# Patient Record
Sex: FEMALE | Race: WHITE | Employment: FULL TIME | URBAN - METROPOLITAN AREA
[De-identification: names, ages, dates, MRNs, and addresses within clinical notes are randomized per-mention and may not be internally consistent; named-entity substitution may affect disease eponyms.]

---

## 2019-10-25 ENCOUNTER — MEDICAL CORRESPONDENCE (OUTPATIENT)
Dept: HEALTH INFORMATION MANAGEMENT | Facility: CLINIC | Age: 34
End: 2019-10-25

## 2022-03-04 ENCOUNTER — TELEPHONE (OUTPATIENT)
Dept: NEUROLOGY | Facility: CLINIC | Age: 37
End: 2022-03-04
Payer: COMMERCIAL

## 2022-03-04 NOTE — TELEPHONE ENCOUNTER
Health Call Center    Phone Message    May a detailed message be left on voicemail: yes     Reason for Call: Appointment Intake    Referring Provider Name: Dr. Vicenta Dominguez, this is a pt of Dr. Dominguez's following to the new practice, MS pt   Diagnosis and/or Symptoms: MS    Please call pt to schedule for MS f/u with Dr. Dominguez. Thank you.      Action Taken: Message routed to:  [facility]    Travel Screening: Not Applicable

## 2022-05-29 ENCOUNTER — HEALTH MAINTENANCE LETTER (OUTPATIENT)
Age: 37
End: 2022-05-29

## 2022-07-25 ENCOUNTER — LAB (OUTPATIENT)
Dept: LAB | Facility: CLINIC | Age: 37
End: 2022-07-25
Payer: COMMERCIAL

## 2022-07-25 ENCOUNTER — OFFICE VISIT (OUTPATIENT)
Dept: NEUROLOGY | Facility: CLINIC | Age: 37
End: 2022-07-25

## 2022-07-25 VITALS — DIASTOLIC BLOOD PRESSURE: 72 MMHG | HEART RATE: 78 BPM | SYSTOLIC BLOOD PRESSURE: 112 MMHG

## 2022-07-25 DIAGNOSIS — G35 MS (MULTIPLE SCLEROSIS) (H): ICD-10-CM

## 2022-07-25 DIAGNOSIS — G35 MS (MULTIPLE SCLEROSIS) (H): Primary | ICD-10-CM

## 2022-07-25 DIAGNOSIS — Z51.81 THERAPEUTIC DRUG MONITORING: ICD-10-CM

## 2022-07-25 DIAGNOSIS — E55.9 VITAMIN D DEFICIENCY: ICD-10-CM

## 2022-07-25 LAB
BASOPHILS # BLD AUTO: 0.1 10E3/UL (ref 0–0.2)
BASOPHILS NFR BLD AUTO: 1 %
EOSINOPHIL # BLD AUTO: 0.4 10E3/UL (ref 0–0.7)
EOSINOPHIL NFR BLD AUTO: 5 %
ERYTHROCYTE [DISTWIDTH] IN BLOOD BY AUTOMATED COUNT: 11.8 % (ref 10–15)
HCT VFR BLD AUTO: 39.9 % (ref 35–47)
HGB BLD-MCNC: 13.7 G/DL (ref 11.7–15.7)
IMM GRANULOCYTES # BLD: 0 10E3/UL
IMM GRANULOCYTES NFR BLD: 0 %
LYMPHOCYTES # BLD AUTO: 1.6 10E3/UL (ref 0.8–5.3)
LYMPHOCYTES NFR BLD AUTO: 19 %
MCH RBC QN AUTO: 29.9 PG (ref 26.5–33)
MCHC RBC AUTO-ENTMCNC: 34.3 G/DL (ref 31.5–36.5)
MCV RBC AUTO: 87 FL (ref 78–100)
MONOCYTES # BLD AUTO: 0.6 10E3/UL (ref 0–1.3)
MONOCYTES NFR BLD AUTO: 8 %
NEUTROPHILS # BLD AUTO: 5.7 10E3/UL (ref 1.6–8.3)
NEUTROPHILS NFR BLD AUTO: 67 %
NRBC # BLD AUTO: 0 10E3/UL
NRBC BLD AUTO-RTO: 0 /100
PLATELET # BLD AUTO: 389 10E3/UL (ref 150–450)
RBC # BLD AUTO: 4.58 10E6/UL (ref 3.8–5.2)
WBC # BLD AUTO: 8.5 10E3/UL (ref 4–11)

## 2022-07-25 PROCEDURE — 82306 VITAMIN D 25 HYDROXY: CPT

## 2022-07-25 PROCEDURE — 82784 ASSAY IGA/IGD/IGG/IGM EACH: CPT

## 2022-07-25 PROCEDURE — 99204 OFFICE O/P NEW MOD 45 MIN: CPT | Mod: GC | Performed by: PSYCHIATRY & NEUROLOGY

## 2022-07-25 PROCEDURE — 85004 AUTOMATED DIFF WBC COUNT: CPT

## 2022-07-25 PROCEDURE — 36415 COLL VENOUS BLD VENIPUNCTURE: CPT

## 2022-07-25 PROCEDURE — 86355 B CELLS TOTAL COUNT: CPT

## 2022-07-25 RX ORDER — OCRELIZUMAB 300 MG/10ML
600 INJECTION INTRAVENOUS
COMMUNITY

## 2022-07-25 RX ORDER — IBUPROFEN 600 MG/1
TABLET, FILM COATED ORAL
COMMUNITY
Start: 2021-10-27 | End: 2022-10-27

## 2022-07-25 RX ORDER — GABAPENTIN 100 MG/1
200 CAPSULE ORAL AT BEDTIME
COMMUNITY
Start: 2019-07-24 | End: 2023-08-24

## 2022-07-25 NOTE — LETTER
7/25/2022         RE: Enedina Leong  [address] [facility] [address]        Dear Colleague,    Thank you for referring your patient, Enedina Leong, to the [facility]. Please see a copy of my visit note below.      Neurology Clinic Visit      Consult requested by: Jackson Dominguez MD  [facility]  [address] [facility] [address]  [place] [address]  Reason: follow up       07/25/2022   Source of information: Patient and chart review    History of Present Symptom:  Enedina Leong is a 36 year old female with a PMH significant for multiple sclerosis who presents today for follow up. She was last seen by Dr. Dominguez via telemedicine 3/1/21.     With heat her left eye becomes blurry. Numbness and tingling in her face on the left around her cheek is similar to in the past.     She continues to tolerate Ocrevus well with no further side effects. She has not missed any infusions (through [facility]).     She denies any new weakness, numbness, or vision changes concerning for a relapse.      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   Bowel/bladder changes: chronic mild urinary issues, since pregnancy   Gait/balance: no issues   Gabapentin 200 mg at night controls pain in the left teeth/face which she has had since her MS relapse.   Vitamin D 5000 IU    The patient's medical, surgical, social, and family history were personally reviewed with the patient.  No past medical history on file.   No past surgical history on file.  Social History     Tobacco Use     Smoking status: Current Every Day Smoker     Types: Vaping Device     Smokeless tobacco: Never Used     No family history on file.  Current Outpatient Medications   Medication      FOLIC ACID PO     gabapentin (NEURONTIN) 100 MG capsule     ibuprofen (ADVIL/MOTRIN) 600 MG tablet     ocrelizumab (OCREVUS) 300 MG/10ML SOLN injection     vitamin D3 (CHOLECALCIFEROL) 1.25 MG (31842 UT) capsule     No current facility-administered medications for this visit.     No Known Allergies      Review of Systems:  14-point review of systems was completed. The pertinent positives and negatives are in the HPI.    Physical Examination   Vitals: /72 (BP Location: Left arm, Patient Position: Sitting)   Pulse 78   General: Patient appears comfortable in no acute distress.   HEENT: NC/AT, no icterus, moist mucous membranes  Chest: non-labored on RA  Extremities: Warm, no edema  Skin: No rash or lesion   Psych: Affect appropriate for situation   Neuro:  Mental status: Awake, alert, attentive. Language is fluent with intact comprehension of commands.  Cranial nerves: PERRL with no relative afferent pupillary defect, conjugate gaze, EOMI, visual fields intact, face symmetric, shoulder shrug strong, tongue protrusion/uvula midline, no dysarthria.   Motor: Normal muscle bulk and tone. No abnormal movements. 5/5 strength in 4/4 extremities.     R L  Deltoid  5 5  Biceps  5 5  Triceps 5 5  Wrist ext 5 5  Finger ext 5 5  Finger abd 5 5    Hip flexion 5 5  Knee flexion 5 5  Knee ext 5 5  Dorsiflexion 5 5    Reflexes: 2+ reflexes symmetric in biceps, brachioradialis, patellae, and achilles. No clonus, toes down-going.  Sensory: Intact to light touch, pin, vibration, and proprioception. Romberg is negative.   Coordination: FNF and HS without ataxia or dysmetria.    Gait: Normal width, stride length, turn, with symmetric arm swing. Tandem walk intact. Able to jump on one foot 5 times.     Laboratory:  All laboratory data reviewed  Vitamin D, 25-OH, Total 30 - 80 ng/mL 55          Imaging:    IMPRESSION:   HEAD MRI:   1.  Unchanged mild burden of presumed demyelinating lesions consistent with history of multiple  sclerosis.   2.  No finding to suggest active demyelination.     CERVICAL SPINE MRI:   1.  Unchanged mild burden of presumed demyelinating lesions in the cervical spinal cord.   2.  No finding to suggest active demyelination.     THORACIC SPINE MRI:   1.  Possible small new demyelinating lesion versus artifact in the right ventral spinal cord at the level of T6.   2.  Otherwise unchanged mild burden of presumed demyelinating lesions in the thoracic spinal cord.   3.  No finding to suggest active demyelination.    Assessment/Plan:  Enedina Leong is a 36 year old female who presents for follow up for multiple sclerosis. She remains stable clinically with intact neurologic exam on Ocrevus since 3/2020. She does have mention per radiology of one possible small new lesions on MRI at T6, we will get these images pushed into our system for closer review. This is compared to MRI one year prior per radiology report. We will obtain new MRIs including this region as it has been one year.    She was changed from Tysabri after becoming HANDY virus positive.     She remains on gabapentin for facial pain secondary to MS relapse in the past.     -obtain imaging from   -continue ocrevus   -blood work today (JCV, IgG, CD19 count, CBC w/dif, vitamin D)  -provided information regarding Ev[facility]   -repeat imaging   -follow up in 6 months     Patient seen and discussed with Dr. Dominguez.   I have reviewed the plan with the patient, who is in agreement.      Katie Marquez DO  Multiple Sclerosis Fellow             Attestation signed by Vicenta Dominguez MD at 7/26/2022  3:29 PM:  Patient seen and discussed with Dr. Marquez. I agree with the findings and recommendations as documented.     30 minutes of time were personally spent in the care of this patient on the date of service  Vicenta Dominguez MD on 7/26/2022 at 3:27 PM       Again, thank you for allowing me to participate in the care of your patient.         Sincerely,        Vicenta Dominguez MD

## 2022-07-25 NOTE — NURSING NOTE
Chief Complaint   Patient presents with     MS     Follow-up.        DENIS Weston on 7/25/2022 at 1:47 PM

## 2022-07-25 NOTE — PATIENT INSTRUCTIONS
[facility]    Continue ocrevus    Blood work today     One more set of annual imaging - to be done now    Follow up in 6 months      You are a candidate for Micki.  This is a monoclonal antibody that helps to boost your immunity to COVID19.  It is administered as 2 intramuscular injections every 6 months.  You can call [phone] to arrange an appointment.       [facility]

## 2022-07-25 NOTE — PROGRESS NOTES
Neurology Clinic Visit      Consult requested by: Jackson Dominguez MD  [facility]  [address]  [place] [address]  Reason: follow up       07/25/2022   Source of information: Patient and chart review    History of Present Symptom:  Enedina Leong is a 36 year old female with a PMH significant for multiple sclerosis who presents today for follow up. She was last seen by Dr. Dominguez via telemedicine 3/1/21.     With heat her left eye becomes blurry. Numbness and tingling in her face on the left around her cheek is similar to in the past.     She continues to tolerate Ocrevus well with no further side effects. She has not missed any infusions (through [facility]).     She denies any new weakness, numbness, or vision changes concerning for a relapse.      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   Bowel/bladder changes: chronic mild urinary issues, since pregnancy   Gait/balance: no issues   Gabapentin 200 mg at night controls pain in the left teeth/face which she has had since her MS relapse.   Vitamin D 5000 IU    The patient's medical, surgical, social, and family history were personally reviewed with the patient.  No past medical history on file.   No past surgical history on file.  Social History     Tobacco Use     Smoking status: Current Every Day Smoker     Types: Vaping Device     Smokeless tobacco: Never Used     No family history on file.  Current Outpatient Medications   Medication     FOLIC ACID PO     gabapentin (NEURONTIN) 100 MG capsule     ibuprofen (ADVIL/MOTRIN) 600 MG tablet     ocrelizumab (OCREVUS) 300 MG/10ML SOLN injection     vitamin D3 (CHOLECALCIFEROL) 1.25 MG (86267 UT) capsule     No current facility-administered  medications for this visit.     No Known Allergies      Review of Systems:  14-point review of systems was completed. The pertinent positives and negatives are in the HPI.    Physical Examination   Vitals: /72 (BP Location: Left arm, Patient Position: Sitting)   Pulse 78   General: Patient appears comfortable in no acute distress.   HEENT: NC/AT, no icterus, moist mucous membranes  Chest: non-labored on RA  Extremities: Warm, no edema  Skin: No rash or lesion   Psych: Affect appropriate for situation   Neuro:  Mental status: Awake, alert, attentive. Language is fluent with intact comprehension of commands.  Cranial nerves: PERRL with no relative afferent pupillary defect, conjugate gaze, EOMI, visual fields intact, face symmetric, shoulder shrug strong, tongue protrusion/uvula midline, no dysarthria.   Motor: Normal muscle bulk and tone. No abnormal movements. 5/5 strength in 4/4 extremities.     R L  Deltoid  5 5  Biceps  5 5  Triceps 5 5  Wrist ext 5 5  Finger ext 5 5  Finger abd 5 5    Hip flexion 5 5  Knee flexion 5 5  Knee ext 5 5  Dorsiflexion 5 5    Reflexes: 2+ reflexes symmetric in biceps, brachioradialis, patellae, and achilles. No clonus, toes down-going.  Sensory: Intact to light touch, pin, vibration, and proprioception. Romberg is negative.   Coordination: FNF and HS without ataxia or dysmetria.    Gait: Normal width, stride length, turn, with symmetric arm swing. Tandem walk intact. Able to jump on one foot 5 times.     Laboratory:  All laboratory data reviewed  Vitamin D, 25-OH, Total 30 - 80 ng/mL 55          Imaging:    IMPRESSION:   HEAD MRI:   1.  Unchanged mild burden of presumed demyelinating lesions consistent with history of multiple sclerosis.   2.  No finding to suggest active demyelination.     CERVICAL SPINE MRI:   1.  Unchanged mild burden of presumed demyelinating lesions in the cervical spinal cord.   2.  No finding to suggest active demyelination.     THORACIC SPINE MRI:   1.   Possible small new demyelinating lesion versus artifact in the right ventral spinal cord at the level of T6.   2.  Otherwise unchanged mild burden of presumed demyelinating lesions in the thoracic spinal cord.   3.  No finding to suggest active demyelination.    Assessment/Plan:  Enedina Leong is a 36 year old female who presents for follow up for multiple sclerosis. She remains stable clinically with intact neurologic exam on Ocrevus since 3/2020. She does have mention per radiology of one possible small new lesions on MRI at T6, we will get these images pushed into our system for closer review. This is compared to MRI one year prior per radiology report. We will obtain new MRIs including this region as it has been one year.    She was changed from Tysabri after becoming HANDY virus positive.     She remains on gabapentin for facial pain secondary to MS relapse in the past.     -obtain imaging from   -continue ocrevus   -blood work today (JCV, IgG, CD19 count, CBC w/dif, vitamin D)  -provided information regarding Evusheld   -repeat imaging   -follow up in 6 months     Patient seen and discussed with Dr. Dominguez.   I have reviewed the plan with the patient, who is in agreement.      Katie Marquez, DO  Multiple Sclerosis Fellow

## 2022-07-26 PROBLEM — G35 MS (MULTIPLE SCLEROSIS) (H): Status: ACTIVE | Noted: 2022-07-26

## 2022-07-26 LAB
CD19 CELLS # BLD: <1 CELLS/UL (ref 107–698)
CD19 CELLS NFR BLD: <1 % (ref 6–27)
DEPRECATED CALCIDIOL+CALCIFEROL SERPL-MC: 78 UG/L (ref 20–75)
IGG SERPL-MCNC: 765 MG/DL (ref 610–1616)

## 2022-08-03 LAB — SCANNED LAB RESULT: ABNORMAL

## 2022-08-12 ENCOUNTER — HOSPITAL ENCOUNTER (OUTPATIENT)
Dept: MRI IMAGING | Facility: CLINIC | Age: 37
Discharge: HOME | End: 2022-08-12
Attending: PSYCHIATRY & NEUROLOGY
Payer: COMMERCIAL

## 2022-08-12 DIAGNOSIS — G35 MS (MULTIPLE SCLEROSIS) (H): ICD-10-CM

## 2022-08-12 PROCEDURE — 72157 MRI CHEST SPINE W/O & W/DYE: CPT

## 2022-08-12 PROCEDURE — 255N000002 HC RX 255 OP 636: Performed by: PSYCHIATRY & NEUROLOGY

## 2022-08-12 PROCEDURE — 72156 MRI NECK SPINE W/O & W/DYE: CPT

## 2022-08-12 PROCEDURE — A9585 GADOBUTROL INJECTION: HCPCS | Performed by: PSYCHIATRY & NEUROLOGY

## 2022-08-12 PROCEDURE — 70553 MRI BRAIN STEM W/O & W/DYE: CPT

## 2022-08-12 RX ORDER — GADOBUTROL 604.72 MG/ML
8 INJECTION INTRAVENOUS ONCE
Status: COMPLETED | OUTPATIENT
Start: 2022-08-12 | End: 2022-08-12

## 2022-08-12 RX ADMIN — GADOBUTROL 8 ML: 604.72 INJECTION INTRAVENOUS at 15:42

## 2022-09-25 ENCOUNTER — TRANSFERRED RECORDS (OUTPATIENT)
Dept: HEALTH INFORMATION MANAGEMENT | Facility: CLINIC | Age: 37
End: 2022-09-25

## 2022-10-03 ENCOUNTER — HEALTH MAINTENANCE LETTER (OUTPATIENT)
Age: 37
End: 2022-10-03

## 2023-02-10 ENCOUNTER — MEDICAL CORRESPONDENCE (OUTPATIENT)
Dept: HEALTH INFORMATION MANAGEMENT | Facility: CLINIC | Age: 38
End: 2023-02-10
Payer: COMMERCIAL

## 2023-02-13 ENCOUNTER — OFFICE VISIT (OUTPATIENT)
Dept: NEUROLOGY | Facility: CLINIC | Age: 38
End: 2023-02-13
Payer: COMMERCIAL

## 2023-02-13 VITALS — DIASTOLIC BLOOD PRESSURE: 70 MMHG | SYSTOLIC BLOOD PRESSURE: 111 MMHG | HEART RATE: 85 BPM

## 2023-02-13 DIAGNOSIS — G35 MS (MULTIPLE SCLEROSIS) (H): Primary | ICD-10-CM

## 2023-02-13 DIAGNOSIS — E55.9 VITAMIN D DEFICIENCY: ICD-10-CM

## 2023-02-13 DIAGNOSIS — R53.82 CHRONIC FATIGUE: ICD-10-CM

## 2023-02-13 DIAGNOSIS — E53.8 FOLATE DEFICIENCY: ICD-10-CM

## 2023-02-13 PROCEDURE — 99214 OFFICE O/P EST MOD 30 MIN: CPT | Performed by: PSYCHIATRY & NEUROLOGY

## 2023-02-13 NOTE — LETTER
2/13/2023         RE: Enedina Leong  [address] [address]        Dear Colleague,    Thank you for referring your patient, Enedina Leong, to the [facility]. Please see a copy of my visit note below.    Date of Service: 2/13/2023    [facility]   MS Clinic Evaluation    Subjective: 37-year-old woman who presents in follow-up for multiple sclerosis.  She does have a history of folate deficiency.    She reports that she had a viral illness in the end of November.  It took her several weeks to recover.  She wonders if she had influenza A.  She notes that illness was likely prolonged because she had to continue to work because they were short staffed.    She has been struggling more for from fatigue since the illness.  She will typically become fatigued approximately 7 hours into her work shift.  She does not report any new symptoms related to multiple sclerosis.    She has continued to get Ocrevus.  She is tolerating this well.  She had no issues with coordinating with home infusion.    She is taking vitamin D3 5000 international units weekly, though she did run out of this a few weeks ago.    No Known Allergies    Current Outpatient Medications   Medication     ocrelizumab (OCREVUS) 300 MG/10ML SOLN injection     vitamin D3 (CHOLECALCIFEROL) 1.25 MG (38976 UT) capsule     FOLIC ACID PO     gabapentin (NEURONTIN) 100 MG capsule     No current facility-administered medications for this visit.        Past medical, surgical, social and family history was personally reviewed. Pertinent details noted above.     Physical Examination:   /70 (BP Location: Right arm, Patient Position: Sitting)   Pulse 85     General: no acute distress  Cranial nerves:   VFFC  PERRL w/no RAPD  EOM full w/no RACHELLE   Face symmetric  Hearing intact  No dysarthria   Motor:   Tone is normal   Bulk is normal     R L  Deltoid  5 5  Biceps  5 5  Triceps 5 5  Wrist  ext 5 5  Finger ext 5 5  Finger abd 5 5    Hip flexion 5 5  Knee flexion 5 5  Knee ext 5 5  Ankle d/f 5 5    Reflexes: 2+ and symmetric throughout, babinski absent bilaterally  Sensory: vibration is mildly reduced in the toes, JPS normal in the toes   Romberg is absent  Coordination: no ataxia or dysmetria  Gait: normal base and stride, tandem gait is intact, able to balance on one foot and hop x 5 bilaterally    Tests/Imaging:   HANDY virus Ab 0.15 -> 0.44  Vitamin D 78  Folate 1    Abs lymph 1600   igg 765    MRI brain   6/2019 - personally reviewed, multiple demyelinating lesions in the deep white matter, small, couple juxtacortical, large lesion in corpus callosum, multiple lesions gd+ including lesion in cc  7/1/2019 - new left frontal lesion gd- another lesion is new and is gd+, pontine lesion unchanged, stable large cc lesion  8/7/19 - left mcp lesion tumefactive gd+, multiple new/growing lesions, approx 9 gd+  10/2019 - no new lesions noted, resolution of mary enhancement  7/2020 - no new lesions, gd-   8/2022 - no new lesions, gd-     MRI cervical spine   6/2019 - dorsal cord lesions at c4-5 and c6, gd-   7/2020 - no new lesions  8/2022 - no new lesions, gd-    MRI thoracic spine   8/2022 - single lesion t6, gd-     Assessment: 37-year-old woman with relapsing-remitting multiple sclerosis who appears to be clinically stable on Ocrevus.  She will continue with therapy every 6 months.  Blood work will be done today to ensure no signs of hematologic or immunologic toxicity with use of high risk medication.    She is struggling with fatigue.  I will check her folate level to ensure no recurrence of deficiency.    Otherwise we did discuss using melatonin for management of sleep.    Plan:   -Blood work today to assess for toxicity  - Plan to continue Ocrevus every 6 months  - Follow-up in 6 months, plan for MRI in 1 year    Note was completed with the assistance of Dragon Fluency software which can often result in  accidental word substitutions.     Billing based on complexity  Vicenta Dominguez MD on 2/13/2023 at 4:00 PM          Again, thank you for allowing me to participate in the care of your patient.        Sincerely,        Vicenta Dominguez MD

## 2023-02-13 NOTE — PATIENT INSTRUCTIONS
Your exam is stable     Continue ocrevus     Blood work today     Try melatonin 2-5 mg 30 minutes before bedtime for sleep     Follow up in 6 months

## 2023-02-13 NOTE — PROGRESS NOTES
Date of Service: 2/13/2023    [facility]   MS Clinic Evaluation    Subjective: 37-year-old woman who presents in follow-up for multiple sclerosis.  She does have a history of folate deficiency.    She reports that she had a viral illness in the end of November.  It took her several weeks to recover.  She wonders if she had influenza A.  She notes that illness was likely prolonged because she had to continue to work because they were short staffed.    She has been struggling more for from fatigue since the illness.  She will typically become fatigued approximately 7 hours into her work shift.  She does not report any new symptoms related to multiple sclerosis.    She has continued to get Ocrevus.  She is tolerating this well.  She had no issues with coordinating with home infusion.    She is taking vitamin D3 5000 international units weekly, though she did run out of this a few weeks ago.    No Known Allergies    Current Outpatient Medications   Medication     ocrelizumab (OCREVUS) 300 MG/10ML SOLN injection     vitamin D3 (CHOLECALCIFEROL) 1.25 MG (64668 UT) capsule     FOLIC ACID PO     gabapentin (NEURONTIN) 100 MG capsule     No current facility-administered medications for this visit.        Past medical, surgical, social and family history was personally reviewed. Pertinent details noted above.     Physical Examination:   /70 (BP Location: Right arm, Patient Position: Sitting)   Pulse 85     General: no acute distress  Cranial nerves:   VFFC  PERRL w/no RAPD  EOM full w/no RACHELLE   Face symmetric  Hearing intact  No dysarthria   Motor:   Tone is normal   Bulk is normal     R L  Deltoid  5 5  Biceps  5 5  Triceps 5 5  Wrist ext 5 5  Finger ext 5 5  Finger abd 5 5    Hip flexion 5 5  Knee flexion 5 5  Knee ext 5 5  Ankle d/f 5 5    Reflexes: 2+ and symmetric throughout, babinski absent bilaterally  Sensory: vibration is mildly reduced in the toes, JPS normal in the toes   Romberg is  absent  Coordination: no ataxia or dysmetria  Gait: normal base and stride, tandem gait is intact, able to balance on one foot and hop x 5 bilaterally    Tests/Imaging:   HANDY virus Ab 0.15 -> 0.44  Vitamin D 78  Folate 1    Abs lymph 1600   igg 765    MRI brain   6/2019 - personally reviewed, multiple demyelinating lesions in the deep white matter, small, couple juxtacortical, large lesion in corpus callosum, multiple lesions gd+ including lesion in cc  7/1/2019 - new left frontal lesion gd- another lesion is new and is gd+, pontine lesion unchanged, stable large cc lesion  8/7/19 - left mcp lesion tumefactive gd+, multiple new/growing lesions, approx 9 gd+  10/2019 - no new lesions noted, resolution of mary enhancement  7/2020 - no new lesions, gd-   8/2022 - no new lesions, gd-     MRI cervical spine   6/2019 - dorsal cord lesions at c4-5 and c6, gd-   7/2020 - no new lesions  8/2022 - no new lesions, gd-    MRI thoracic spine   8/2022 - single lesion t6, gd-     Assessment: 37-year-old woman with relapsing-remitting multiple sclerosis who appears to be clinically stable on Ocrevus.  She will continue with therapy every 6 months.  Blood work will be done today to ensure no signs of hematologic or immunologic toxicity with use of high risk medication.    She is struggling with fatigue.  I will check her folate level to ensure no recurrence of deficiency.    Otherwise we did discuss using melatonin for management of sleep.    Plan:   -Blood work today to assess for toxicity  - Plan to continue Ocrevus every 6 months  - Follow-up in 6 months, plan for MRI in 1 year    Note was completed with the assistance of Dragon Fluency software which can often result in accidental word substitutions.     Billing based on complexity  Vicenta Dominguez MD on 2/13/2023 at 4:00 PM

## 2023-03-22 ENCOUNTER — DOCUMENTATION ONLY (OUTPATIENT)
Dept: NEUROLOGY | Facility: CLINIC | Age: 38
End: 2023-03-22
Payer: COMMERCIAL

## 2023-03-22 NOTE — PROGRESS NOTES
Plan of care received from [facility] and placed in Dr. Gandhi's folder for review and signature.   Rafa Joaquin EMT 03/22/2023 3:19PM

## 2023-03-24 NOTE — PROGRESS NOTES
Plan of care from [facility] signed and faxed back at [phone]  Rafa Joaquin EMT 03/24/2023 8:20AM

## 2023-04-04 ENCOUNTER — TELEPHONE (OUTPATIENT)
Dept: NEUROLOGY | Facility: CLINIC | Age: 38
End: 2023-04-04
Payer: COMMERCIAL

## 2023-04-04 DIAGNOSIS — G35 MS (MULTIPLE SCLEROSIS) (H): Primary | ICD-10-CM

## 2023-04-04 NOTE — TELEPHONE ENCOUNTER
Dr. Dominguez,    Pt's spouse Taco (consent to speak in chart) called to ask if you can put new orders in for MRI Brain, MRI Thoracic Spine and MRI Cervical Spine.     The orders that was put in on 7/25/2022 was closed out by someone and so when the pt tried to call to reschedule her appts from Feb 13, 2023, the radiology scheduling team was not able to schedule because they could not see the orders.    Pt is hoping to schedule the images for this Friday so he asked if you can put them in asap.     Please forward encounter back to me so I can call the spouse to let him know that the orders are in.     Thank you.    DENIS Weston on 4/4/2023 at 10:22 AM

## 2023-04-04 NOTE — TELEPHONE ENCOUNTER
Health Call Center    Phone Message    May a detailed message be left on voicemail: yes     Reason for Call: Order(s): Other:   Reason for requested: MRI(s)  Date needed: ASAP  Provider name: Dr. Dominguez    Per spouse Taco Pt was to have imaging after her appt with Dr. Dominguez on 2/13/23, but needed to get back to work and did not get imaging. New orders are now needed to schedule.    Please place orders and call Taco back at [phone] to advise when done. He is hoping to schedule imaging for this week yet.      Action Taken: Message routed to:  Other: [facility]    Travel Screening: Not Applicable

## 2023-04-04 NOTE — TELEPHONE ENCOUNTER
Called the pt's spouse and informed him that per Dr. Dominguez had put orders in. He verbalized understanding and have no other questions.      DENIS Weston on 4/4/2023 at 11:36 AM

## 2023-04-05 ENCOUNTER — TRANSFERRED RECORDS (OUTPATIENT)
Dept: HEALTH INFORMATION MANAGEMENT | Facility: CLINIC | Age: 38
End: 2023-04-05
Payer: COMMERCIAL

## 2023-04-05 NOTE — PROGRESS NOTES
New plan of care received and placed in Dr. Dominguez's folder for review and signature.   Rafa Joaquin EMT 04/05/2023 1:32PM

## 2023-04-06 NOTE — PROGRESS NOTES
New plan of care from [facility] has been signed and faxed back at [phone]  Rafa PEREZ 04/06/2023 8:14AM

## 2023-06-04 ENCOUNTER — HEALTH MAINTENANCE LETTER (OUTPATIENT)
Age: 38
End: 2023-06-04

## 2023-08-24 ENCOUNTER — OFFICE VISIT (OUTPATIENT)
Dept: NEUROLOGY | Facility: CLINIC | Age: 38
End: 2023-08-24
Payer: COMMERCIAL

## 2023-08-24 ENCOUNTER — LAB (OUTPATIENT)
Dept: LAB | Facility: CLINIC | Age: 38
End: 2023-08-24
Payer: COMMERCIAL

## 2023-08-24 VITALS — SYSTOLIC BLOOD PRESSURE: 102 MMHG | HEART RATE: 71 BPM | DIASTOLIC BLOOD PRESSURE: 62 MMHG

## 2023-08-24 DIAGNOSIS — E53.8 FOLATE DEFICIENCY: ICD-10-CM

## 2023-08-24 DIAGNOSIS — N31.9 NEUROGENIC BLADDER: ICD-10-CM

## 2023-08-24 DIAGNOSIS — G35 MS (MULTIPLE SCLEROSIS) (H): Primary | ICD-10-CM

## 2023-08-24 DIAGNOSIS — G35 MS (MULTIPLE SCLEROSIS) (H): ICD-10-CM

## 2023-08-24 DIAGNOSIS — Z51.81 THERAPEUTIC DRUG MONITORING: ICD-10-CM

## 2023-08-24 LAB
BASOPHILS # BLD AUTO: 0.1 10E3/UL (ref 0–0.2)
BASOPHILS NFR BLD AUTO: 1 %
EOSINOPHIL # BLD AUTO: 0.3 10E3/UL (ref 0–0.7)
EOSINOPHIL NFR BLD AUTO: 4 %
ERYTHROCYTE [DISTWIDTH] IN BLOOD BY AUTOMATED COUNT: 12 % (ref 10–15)
FOLATE SERPL-MCNC: 12.8 NG/ML (ref 4.6–34.8)
HCT VFR BLD AUTO: 41.3 % (ref 35–47)
HGB BLD-MCNC: 14.1 G/DL (ref 11.7–15.7)
IMM GRANULOCYTES # BLD: 0 10E3/UL
IMM GRANULOCYTES NFR BLD: 0 %
LYMPHOCYTES # BLD AUTO: 1.5 10E3/UL (ref 0.8–5.3)
LYMPHOCYTES NFR BLD AUTO: 22 %
MCH RBC QN AUTO: 30.4 PG (ref 26.5–33)
MCHC RBC AUTO-ENTMCNC: 34.1 G/DL (ref 31.5–36.5)
MCV RBC AUTO: 89 FL (ref 78–100)
MONOCYTES # BLD AUTO: 0.7 10E3/UL (ref 0–1.3)
MONOCYTES NFR BLD AUTO: 9 %
NEUTROPHILS # BLD AUTO: 4.4 10E3/UL (ref 1.6–8.3)
NEUTROPHILS NFR BLD AUTO: 64 %
NRBC # BLD AUTO: 0 10E3/UL
NRBC BLD AUTO-RTO: 0 /100
PLATELET # BLD AUTO: 360 10E3/UL (ref 150–450)
RBC # BLD AUTO: 4.64 10E6/UL (ref 3.8–5.2)
WBC # BLD AUTO: 7 10E3/UL (ref 4–11)

## 2023-08-24 PROCEDURE — 99214 OFFICE O/P EST MOD 30 MIN: CPT | Performed by: PSYCHIATRY & NEUROLOGY

## 2023-08-24 PROCEDURE — 36415 COLL VENOUS BLD VENIPUNCTURE: CPT

## 2023-08-24 PROCEDURE — 82784 ASSAY IGA/IGD/IGG/IGM EACH: CPT

## 2023-08-24 PROCEDURE — 86355 B CELLS TOTAL COUNT: CPT

## 2023-08-24 PROCEDURE — 82746 ASSAY OF FOLIC ACID SERUM: CPT

## 2023-08-24 PROCEDURE — 85025 COMPLETE CBC W/AUTO DIFF WBC: CPT

## 2023-08-24 RX ORDER — OXYBUTYNIN CHLORIDE 5 MG/1
5 TABLET ORAL 2 TIMES DAILY
Qty: 60 TABLET | Refills: 5 | Status: SHIPPED | OUTPATIENT
Start: 2023-08-24 | End: 2024-03-04

## 2023-08-24 NOTE — NURSING NOTE
Chief Complaint   Patient presents with    Follow Up       DENIS Weston on 8/24/2023 at 11:58 AM

## 2023-08-24 NOTE — PROGRESS NOTES
Date of Service: 8/24/2023    [facility] Neurology   MS Clinic Evaluation    Subjective: 37-year-old woman who presents in follow-up for multiple sclerosis.  She does have a history of folate deficiency.    She does not report any discrete new symptoms related to multiple sclerosis.    She has not had any significant viral illnesses.    She reports that her balance is stable.  When she is overheated her vision will get cloudy from the left eye and she will experience the left facial dysesthesias.  However overall the symptoms have reduced gradually over time.    She denies experiencing any gait limitation and has not had any falls since her last visit with me.    She continues to receive Ocrevus routinely.  Her most recent dose was administered in March.  These are administered through home infusions.  They are well-tolerated.    She does experience some urinary urgency.  This can be quite significant and can result in accidents.  She is open to trying a medication to help with this.    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        Past medical, surgical, social and family history was personally reviewed. Pertinent details noted above.     Physical Examination:   /62 (BP Location: Left arm, Patient Position: Sitting)   Pulse 71     General: no acute distress  Cranial nerves:   VFFC  PER  EOM full w/no RACHELLE   Face symmetric  Hearing intact  No dysarthria   Motor:   Tone is normal   Bulk is normal      R L  Deltoid  5 5  Biceps  5 5  Triceps 5 5  Wrist ext 5 5  Finger ext 5 5  Finger abd 5 5    Hip flexion 5 5  Knee flexion 5 5  Knee ext 5 5  Ankle d/f 5 5    Reflexes: 2+ and symmetric throughout, babinski absent bilaterally  Sensory: vibration is mildly reduced in the toes, JPS normal in the toes   Romberg is absent  Coordination: no ataxia or dysmetria  Gait: normal base and stride, tandem gait is intact, able to balance on one foot and hop x 5 bilaterally, sl difficulty getting up from chair w one leg    Tests/Imaging:   HANDY virus Ab 0.15 -> 0.44  Vitamin D 78  Folate 7.2    Abs lymph 1600   igg 765    MRI brain   6/2019 - personally reviewed, multiple demyelinating lesions in the deep white matter, small, couple juxtacortical, large lesion in corpus callosum, multiple lesions gd+ including lesion in cc  7/1/2019 - new left frontal lesion gd- another lesion is new and is gd+, pontine lesion unchanged, stable large cc lesion  8/7/19 - left mcp lesion tumefactive gd+, multiple new/growing lesions, approx 9 gd+  10/2019 - no new lesions noted, resolution of mary enhancement  7/2020 - no new lesions, gd-   8/2022 - no new lesions, gd-     MRI cervical spine   6/2019 - dorsal cord lesions at c4-5 and c6, gd-   7/2020 - no new lesions  8/2022 - no new lesions, gd-    MRI thoracic spine   8/2022 - single lesion t6, gd-     Assessment: 37-year-old woman with relapsing-remitting multiple sclerosis who remains clinically stable on ocrelizumab.  She will continue with Ocrevus every 6 months.  Blood work will be done today to ensure no signs of hematologic or immunologic toxicity.  She will be due for radiologic surveillance in 6 months.    I recommended a trial of oxybutynin.  We discussed common side effects.  If her schedule allows, pelvic floor therapy would likely be helpful for her symptoms as well.    We will check her folate level today as she does have a history of deficiency.    Plan:   -Continue Ocrevus 600 mg  IV every 6 months  - Blood work today to ensure no signs of toxicity  - Folate level  - Oxybutynin trial  - MRI in 6 months  - Follow-up after MRI    Note was completed with the assistance of Dragon Fluency software which can often result in accidental word substitutions.     30 minutes spent in the care of this patient on the date of service  Vicenta Dominguez MD on 8/24/2023 at 12:20 PM

## 2023-08-24 NOTE — LETTER
8/24/2023         RE: Enedina Leong  [address] [address]        Dear Colleague,    Thank you for referring your patient, Enedina Leong, to the [facility] NEUROLOGY CLINIC [facility]. Please see a copy of my visit note below.    Date of Service: 8/24/2023    [facility] Neurology   MS Clinic Evaluation    Subjective: 37-year-old woman who presents in follow-up for multiple sclerosis.  She does have a history of folate deficiency.    She does not report any discrete new symptoms related to multiple sclerosis.    She has not had any significant viral illnesses.    She reports that her balance is stable.  When she is overheated her vision will get cloudy from the left eye and she will experience the left facial dysesthesias.  However overall the symptoms have reduced gradually over time.    She denies experiencing any gait limitation and has not had any falls since her last visit with me.    She continues to receive Ocrevus routinely.  Her most recent dose was administered in March.  These are administered through home infusions.  They are well-tolerated.    She does experience some urinary urgency.  This can be quite significant and can result in accidents.  She is open to trying a medication to help with this.    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        Past medical, surgical, social and family history was personally reviewed. Pertinent details noted above.     Physical  Examination:   /62 (BP Location: Left arm, Patient Position: Sitting)   Pulse 71     General: no acute distress  Cranial nerves:   VFFC  PER  EOM full w/no RACHELLE   Face symmetric  Hearing intact  No dysarthria   Motor:   Tone is normal   Bulk is normal     R L  Deltoid  5 5  Biceps  5 5  Triceps 5 5  Wrist ext 5 5  Finger ext 5 5  Finger abd 5 5    Hip flexion 5 5  Knee flexion 5 5  Knee ext 5 5  Ankle d/f 5 5    Reflexes: 2+ and symmetric throughout, babinski absent bilaterally  Sensory: vibration is mildly reduced in the toes, JPS normal in the toes   Romberg is absent  Coordination: no ataxia or dysmetria  Gait: normal base and stride, tandem gait is intact, able to balance on one foot and hop x 5 bilaterally, sl difficulty getting up from chair w one leg    Tests/Imaging:   HANDY virus Ab 0.15 -> 0.44  Vitamin D 78  Folate 7.2    Abs lymph 1600   igg 765    MRI brain   6/2019 - personally reviewed, multiple demyelinating lesions in the deep white matter, small, couple juxtacortical, large lesion in corpus callosum, multiple lesions gd+ including lesion in cc  7/1/2019 - new left frontal lesion gd- another lesion is new and is gd+, pontine lesion unchanged, stable large cc lesion  8/7/19 - left mcp lesion tumefactive gd+, multiple new/growing lesions, approx 9 gd+  10/2019 - no new lesions noted, resolution of mary enhancement  7/2020 - no new lesions, gd-   8/2022 - no new lesions, gd-     MRI cervical spine   6/2019 - dorsal cord lesions at c4-5 and c6, gd-   7/2020 - no new lesions  8/2022 - no new lesions, gd-    MRI thoracic spine   8/2022 - single lesion t6, gd-     Assessment: 37-year-old woman with relapsing-remitting multiple sclerosis who remains clinically stable on ocrelizumab.  She will continue with Ocrevus every 6 months.  Blood work will be done today to ensure no signs of hematologic or immunologic toxicity.  She will be due for radiologic surveillance in 6 months.    I recommended a trial of  oxybutynin.  We discussed common side effects.  If her schedule allows, pelvic floor therapy would likely be helpful for her symptoms as well.    We will check her folate level today as she does have a history of deficiency.    Plan:   -Continue Ocrevus 600 mg IV every 6 months  - Blood work today to ensure no signs of toxicity  - Folate level  - Oxybutynin trial  - MRI in 6 months  - Follow-up after MRI    Note was completed with the assistance of Dragon Fluency software which can often result in accidental word substitutions.     30 minutes spent in the care of this patient on the date of service  Vicenta Dominguez MD on 8/24/2023 at 12:20 PM        Again, thank you for allowing me to participate in the care of your patient.        Sincerely,        Vicenta Dominguez MD

## 2023-08-24 NOTE — PATIENT INSTRUCTIONS
Mri 6 months     Blood work today     Try oxybutynin for urinary urgency   Start with 1 twice per day   Let me know if you experience significant dry mouth   I can switch you to an extended release   Or you can take it as needed before busy events     Follow up in 6 months

## 2023-08-25 LAB
CD19 B CELL COMMENT: ABNORMAL
CD19 CELLS # BLD: <1 CELLS/UL (ref 107–698)
CD19 CELLS NFR BLD: <1 % (ref 6–27)
IGG SERPL-MCNC: 727 MG/DL (ref 610–1616)

## 2023-09-06 LAB — SCANNED LAB RESULT: NORMAL

## 2023-09-25 ENCOUNTER — TRANSFERRED RECORDS (OUTPATIENT)
Dept: HEALTH INFORMATION MANAGEMENT | Facility: CLINIC | Age: 38
End: 2023-09-25
Payer: COMMERCIAL

## 2023-09-25 ENCOUNTER — MEDICAL CORRESPONDENCE (OUTPATIENT)
Dept: HEALTH INFORMATION MANAGEMENT | Facility: CLINIC | Age: 38
End: 2023-09-25
Payer: COMMERCIAL

## 2024-02-01 ENCOUNTER — MEDICAL CORRESPONDENCE (OUTPATIENT)
Dept: HEALTH INFORMATION MANAGEMENT | Facility: CLINIC | Age: 39
End: 2024-02-01
Payer: COMMERCIAL

## 2024-02-05 ENCOUNTER — MEDICAL CORRESPONDENCE (OUTPATIENT)
Dept: HEALTH INFORMATION MANAGEMENT | Facility: CLINIC | Age: 39
End: 2024-02-05
Payer: COMMERCIAL

## 2024-02-23 ENCOUNTER — MYC MEDICAL ADVICE (OUTPATIENT)
Dept: NEUROLOGY | Facility: CLINIC | Age: 39
End: 2024-02-23
Payer: COMMERCIAL

## 2024-02-29 ENCOUNTER — DOCUMENTATION ONLY (OUTPATIENT)
Dept: NEUROLOGY | Facility: CLINIC | Age: 39
End: 2024-02-29
Payer: COMMERCIAL

## 2024-02-29 ENCOUNTER — TELEPHONE (OUTPATIENT)
Dept: NEUROLOGY | Facility: CLINIC | Age: 39
End: 2024-02-29
Payer: COMMERCIAL

## 2024-02-29 NOTE — LETTER
February 29, 2024      Enedina Leong  [address] [facility] [address] [address]        To Whom It May Concern,     I am writing to request an appeal for coverage of ocrelizumab 600 mg IV every 6 months for the treatment of Mrs. Enedina Leong's multiple sclerosis.     Mrs. Leong has been under my care for multiple sclerosis since June 2019.  She had aggressive onset of disease with multiple active lesions.  She was initially treated with natalizumab, but developed positive HANDY virus antibodies.  She was subsequently placed on ocrelizumab on March 26, 2020.  She has remained on this treatment ever since.  She tolerates infusions well and has been clinically stable.  Ocrelizumab has not been prescribed with other disease modifying therapies for multiple sclerosis.    It is strongly recommended that Mrs. Leong remains on ocrelizumab 600 mg IV every 6 months.  Without this treatment she is at very high risk of new disability secondary to multiple sclerosis.  Please approve this medication urgently so that her treatment is not disrupted.          Sincerely,        Vicenta Dominguez MD

## 2024-02-29 NOTE — TELEPHONE ENCOUNTER
Appeal Letter faxed to [facility] ([phone])    Dona Castaneda on 2/29/2024 at 2:06 PM

## 2024-02-29 NOTE — TELEPHONE ENCOUNTER
Health Call Center    Phone Message    May a detailed message be left on voicemail: yes     Reason for Call: Medication Question or concern regarding medication   Prescription Clarification  Name of Medication: OCREVUS  Prescribing Provider: Vicenta Dominguez   Pharmacy: N/A   What on the order needs clarification?     Maxine from [employer] is requesting a call back to discuss the PA for the OCREVUS.     Maxine states it was denied and is possibly requesting a peer to peer.     Please call Maxine back to advise at # [phone].    [facility] insurance # [phone].      Action Taken: Message routed to:  Other: [facility]     Travel Screening: Not Applicable

## 2024-03-04 ENCOUNTER — OFFICE VISIT (OUTPATIENT)
Dept: NEUROLOGY | Facility: CLINIC | Age: 39
End: 2024-03-04
Payer: COMMERCIAL

## 2024-03-04 ENCOUNTER — LAB (OUTPATIENT)
Dept: LAB | Facility: CLINIC | Age: 39
End: 2024-03-04
Payer: COMMERCIAL

## 2024-03-04 VITALS — HEART RATE: 76 BPM | SYSTOLIC BLOOD PRESSURE: 118 MMHG | DIASTOLIC BLOOD PRESSURE: 74 MMHG

## 2024-03-04 DIAGNOSIS — Z51.81 THERAPEUTIC DRUG MONITORING: ICD-10-CM

## 2024-03-04 DIAGNOSIS — G35 MS (MULTIPLE SCLEROSIS) (H): Primary | ICD-10-CM

## 2024-03-04 DIAGNOSIS — G35 MS (MULTIPLE SCLEROSIS) (H): ICD-10-CM

## 2024-03-04 DIAGNOSIS — N31.9 NEUROGENIC BLADDER: ICD-10-CM

## 2024-03-04 LAB
BASOPHILS # BLD AUTO: 0 10E3/UL (ref 0–0.2)
BASOPHILS NFR BLD AUTO: 0 %
CD19 B CELL COMMENT: ABNORMAL
CD19 CELLS # BLD: 1 CELLS/UL (ref 107–698)
CD19 CELLS NFR BLD: <1 % (ref 6–27)
EOSINOPHIL # BLD AUTO: 0.2 10E3/UL (ref 0–0.7)
EOSINOPHIL NFR BLD AUTO: 1 %
ERYTHROCYTE [DISTWIDTH] IN BLOOD BY AUTOMATED COUNT: 11.9 % (ref 10–15)
HCT VFR BLD AUTO: 40.1 % (ref 35–47)
HGB BLD-MCNC: 14 G/DL (ref 11.7–15.7)
IMM GRANULOCYTES # BLD: 0.1 10E3/UL
IMM GRANULOCYTES NFR BLD: 0 %
LYMPHOCYTES # BLD AUTO: 1.5 10E3/UL (ref 0–5.3)
LYMPHOCYTES NFR BLD AUTO: 13 %
MCH RBC QN AUTO: 30.5 PG (ref 26.5–33)
MCHC RBC AUTO-ENTMCNC: 34.9 G/DL (ref 31.5–36.5)
MCV RBC AUTO: 87 FL (ref 78–100)
MONOCYTES # BLD AUTO: 0.9 10E3/UL (ref 0–1.3)
MONOCYTES NFR BLD AUTO: 8 %
NEUTROPHILS # BLD AUTO: 9.2 10E3/UL (ref 1.6–8.3)
NEUTROPHILS NFR BLD AUTO: 78 %
NRBC # BLD AUTO: 0 10E3/UL
NRBC BLD AUTO-RTO: 0 /100
PLATELET # BLD AUTO: 473 10E3/UL (ref 150–450)
RBC # BLD AUTO: 4.59 10E6/UL (ref 3.8–5.2)
WBC # BLD AUTO: 11.8 10E3/UL (ref 4–11)

## 2024-03-04 PROCEDURE — 99214 OFFICE O/P EST MOD 30 MIN: CPT | Performed by: PSYCHIATRY & NEUROLOGY

## 2024-03-04 PROCEDURE — 82784 ASSAY IGA/IGD/IGG/IGM EACH: CPT

## 2024-03-04 PROCEDURE — 85025 COMPLETE CBC W/AUTO DIFF WBC: CPT

## 2024-03-04 PROCEDURE — 86355 B CELLS TOTAL COUNT: CPT

## 2024-03-04 PROCEDURE — 36415 COLL VENOUS BLD VENIPUNCTURE: CPT

## 2024-03-04 RX ORDER — OXYBUTYNIN CHLORIDE 5 MG/1
5 TABLET ORAL 2 TIMES DAILY
Qty: 60 TABLET | Refills: 5 | Status: SHIPPED | OUTPATIENT
Start: 2024-03-04 | End: 2024-09-05

## 2024-03-04 NOTE — LETTER
3/4/2024         RE: Enedina Leong  [address] [facility] [address]        Dear Colleague,    Thank you for referring your patient, Enedina Leong, to the [facility] NEUROLOGY CLINIC [facility]. Please see a copy of my visit note below.    Date of Service: 3/4/2024    [facility] Neurology   MS Clinic Evaluation    Subjective: 38-year-old woman who presents in follow-up for multiple sclerosis.  She does have a history of folate deficiency.    No discrete new symptoms since her last visit with me.    She was supposed to receive her Ocrevus infusion yesterday.  Unfortunately insurance denied the infusion.    She is noticing a slight increase in fatigue and left facial dysesthesias.  This has increased over the past couple of weeks.    In the past couple days she has noticed that when she wakes up in the morning she gets a hot sensation and feels lightheaded.  She wonders if this is a symptom of early menopause.    She does the best she can to stay well-hydrated.    She remains physically active.  She was able to do a 7 to 8 mile hike.  She notes that at the end of that hike there was a notable incline where she was recorded to climb 44 flights of stairs.  Climbing the stairs was difficult.  She notes that she averages about 5 miles of walking per day.  She has had 1 slip/fall since her last visit with me, though this was in the setting of ice.    She continues to have some urinary urgency, but does find oxybutynin helpful.  She is most affected by some stress incontinence because of a cough related to an upper respiratory tract infection.  She had 1 URI that started about 1 month ago.  The cough is just now starting to improve over the last week.  She otherwise has not struggled with any recurrent infections.    She continues to receive Ocrevus without infusion reaction.    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        Past medical, surgical, social and family history was personally reviewed. Pertinent details noted above.     Physical Examination:   /74 (BP Location: Right arm, Patient Position: Sitting, Cuff Size: Adult Large)   Pulse 76     General: no acute distress  Cranial nerves:   VFFC  PER  EOM full w/no RACHELLE   Face symmetric  Hearing intact  No dysarthria   Motor:   Tone is normal   Bulk is normal     R L  Deltoid  5 5  Biceps  5 5  Triceps 5 5  Wrist ext 5 5  Finger ext 5 5  Finger abd 5 5    Hip flexion 5 5  Knee flexion 5 5  Knee ext 5 5  Ankle d/f 5 5    Reflexes: 2+ and symmetric throughout, babinski absent bilaterally  Sensory: vibration is mildly reduced in the toes, JPS normal in the toes   Romberg is absent  Coordination: no ataxia or dysmetria  Gait: normal base and stride, tandem gait is intact, able to balance on one foot and hop x 5 bilaterally, able to get up from chair with single leg    Tests/Imaging:   HANDY virus Ab 0.15 -> 0.44 -< 0.37  Vitamin D 78  Folate 7.2 -> 12.8    Abs lymph 1600 -> 1500  Cd19 < 1  igg 765 -> 727    MRI brain   6/2019 - personally reviewed, multiple demyelinating lesions in the deep white matter, small, couple juxtacortical, large lesion in corpus callosum, multiple lesions gd+ including lesion in cc  7/1/2019 - new left frontal lesion gd- another lesion is new and is gd+, pontine lesion unchanged, stable large cc lesion  8/7/19 - left mcp lesion tumefactive gd+, multiple new/growing lesions, approx 9 gd+  10/2019 - no new lesions noted, resolution of mary  enhancement  7/2020 - no new lesions, gd-   8/2022 - no new lesions, gd-     MRI cervical spine   6/2019 - dorsal cord lesions at c4-5 and c6, gd-   7/2020 - no new lesions  8/2022 - no new lesions, gd-    MRI thoracic spine   8/2022 - single lesion t6, gd-     Assessment: 38-year-old woman with relapsing-remitting multiple sclerosis who remains clinically stable on ocrelizumab.  She is due for radiologic surveillance.  Given that her clinical examination appears stable today, she will plan to do MRI in 6 months.    Blood work will be done today to monitor for any signs of hematologic or immunologic toxicity.    She will continue with oxybutynin for urinary urgency.    Plan:   -Continue Ocrevus 600 mg IV every 6 months  - Blood work today to ensure no signs of toxicity  - Continue oxybutynin  - MRI in 6 months  - Follow-up after MRI  Note was completed with the assistance of Dragon Fluency software which can often result in accidental word substitutions.     30 minutes spent in the care of this patient on the date of service  Vicenta Dominguez MD on 3/4/2024 at 9:10 AM          Again, thank you for allowing me to participate in the care of your patient.        Sincerely,        Vicenta Dominguez MD

## 2024-03-04 NOTE — PROGRESS NOTES
Date of Service: 3/4/2024    [facility] Neurology   MS Clinic Evaluation    Subjective: 38-year-old woman who presents in follow-up for multiple sclerosis.  She does have a history of folate deficiency.    No discrete new symptoms since her last visit with me.    She was supposed to receive her Ocrevus infusion yesterday.  Unfortunately insurance denied the infusion.    She is noticing a slight increase in fatigue and left facial dysesthesias.  This has increased over the past couple of weeks.    In the past couple days she has noticed that when she wakes up in the morning she gets a hot sensation and feels lightheaded.  She wonders if this is a symptom of early menopause.    She does the best she can to stay well-hydrated.    She remains physically active.  She was able to do a 7 to 8 mile hike.  She notes that at the end of that hike there was a notable incline where she was recorded to climb 44 flights of stairs.  Climbing the stairs was difficult.  She notes that she averages about 5 miles of walking per day.  She has had 1 slip/fall since her last visit with me, though this was in the setting of ice.    She continues to have some urinary urgency, but does find oxybutynin helpful.  She is most affected by some stress incontinence because of a cough related to an upper respiratory tract infection.  She had 1 URI that started about 1 month ago.  The cough is just now starting to improve over the last week.  She otherwise has not struggled with any recurrent infections.    She continues to receive Ocrevus without infusion reaction.    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        Past medical, surgical, social and family history was personally reviewed. Pertinent details noted above.     Physical Examination:   /74 (BP Location: Right arm, Patient Position: Sitting, Cuff Size: Adult Large)   Pulse 76     General: no acute distress  Cranial nerves:   VFFC  PER  EOM full w/no RACHELLE   Face symmetric  Hearing intact  No dysarthria   Motor:   Tone is normal   Bulk is normal     R L  Deltoid  5 5  Biceps  5 5  Triceps 5 5  Wrist ext 5 5  Finger ext 5 5  Finger abd 5 5    Hip flexion 5 5  Knee flexion 5 5  Knee ext 5 5  Ankle d/f 5 5    Reflexes: 2+ and symmetric throughout, babinski absent bilaterally  Sensory: vibration is mildly reduced in the toes, JPS normal in the toes   Romberg is absent  Coordination: no ataxia or dysmetria  Gait: normal base and stride, tandem gait is intact, able to balance on one foot and hop x 5 bilaterally, able to get up from chair with single leg    Tests/Imaging:   HANDY virus Ab 0.15 -> 0.44 -< 0.37  Vitamin D 78  Folate 7.2 -> 12.8    Abs lymph 1600 -> 1500  Cd19 < 1  igg 765 -> 727    MRI brain   6/2019 - personally reviewed, multiple demyelinating lesions in the deep white matter, small, couple juxtacortical, large lesion in corpus callosum, multiple lesions gd+ including lesion in cc  7/1/2019 - new left frontal lesion gd- another lesion is new and is gd+, pontine lesion unchanged, stable large cc lesion  8/7/19 - left mcp lesion tumefactive gd+, multiple new/growing lesions, approx 9 gd+  10/2019 - no new lesions noted, resolution of mary enhancement  7/2020 - no new lesions, gd-   8/2022 - no new lesions, gd-     MRI cervical spine   6/2019 - dorsal cord lesions at c4-5 and c6, gd-   7/2020 - no new lesions  8/2022 - no new lesions, gd-    MRI thoracic spine   8/2022 - single lesion t6, gd-     Assessment: 38-year-old  woman with relapsing-remitting multiple sclerosis who remains clinically stable on ocrelizumab.  She is due for radiologic surveillance.  Given that her clinical examination appears stable today, she will plan to do MRI in 6 months.    Blood work will be done today to monitor for any signs of hematologic or immunologic toxicity.    She will continue with oxybutynin for urinary urgency.    Plan:   -Continue Ocrevus 600 mg IV every 6 months  - Blood work today to ensure no signs of toxicity  - Continue oxybutynin  - MRI in 6 months  - Follow-up after MRI  Note was completed with the assistance of Dragon Fluency software which can often result in accidental word substitutions.     30 minutes spent in the care of this patient on the date of service  Vicenta Dominguez MD on 3/4/2024 at 9:10 AM

## 2024-03-05 LAB — IGG SERPL-MCNC: 710 MG/DL (ref 610–1616)

## 2024-03-10 ENCOUNTER — HEALTH MAINTENANCE LETTER (OUTPATIENT)
Age: 39
End: 2024-03-10

## 2024-03-25 ENCOUNTER — TRANSFERRED RECORDS (OUTPATIENT)
Dept: HEALTH INFORMATION MANAGEMENT | Facility: CLINIC | Age: 39
End: 2024-03-25
Payer: COMMERCIAL

## 2024-03-26 ENCOUNTER — DOCUMENTATION ONLY (OUTPATIENT)
Dept: NEUROLOGY | Facility: CLINIC | Age: 39
End: 2024-03-26
Payer: COMMERCIAL

## 2024-03-26 NOTE — PROGRESS NOTES
Plan of Care/Plan of Treatment received from [facility]. Signed by Dr. Dominguez and faxed back to [phone]

## 2024-07-28 ENCOUNTER — HEALTH MAINTENANCE LETTER (OUTPATIENT)
Age: 39
End: 2024-07-28

## 2024-08-21 ENCOUNTER — HOSPITAL ENCOUNTER (OUTPATIENT)
Dept: MRI IMAGING | Facility: HOSPITAL | Age: 39
Discharge: HOME | End: 2024-08-21
Attending: PSYCHIATRY & NEUROLOGY | Admitting: PSYCHIATRY & NEUROLOGY
Payer: COMMERCIAL

## 2024-08-21 DIAGNOSIS — G35 MS (MULTIPLE SCLEROSIS) (H): ICD-10-CM

## 2024-08-21 PROCEDURE — 70553 MRI BRAIN STEM W/O & W/DYE: CPT

## 2024-08-21 PROCEDURE — A9585 GADOBUTROL INJECTION: HCPCS | Performed by: PSYCHIATRY & NEUROLOGY

## 2024-08-21 PROCEDURE — 72157 MRI CHEST SPINE W/O & W/DYE: CPT

## 2024-08-21 PROCEDURE — 72156 MRI NECK SPINE W/O & W/DYE: CPT

## 2024-08-21 PROCEDURE — 255N000002 HC RX 255 OP 636: Performed by: PSYCHIATRY & NEUROLOGY

## 2024-08-21 RX ORDER — GADOBUTROL 604.72 MG/ML
0.1 INJECTION INTRAVENOUS ONCE
Status: COMPLETED | OUTPATIENT
Start: 2024-08-21 | End: 2024-08-21

## 2024-08-21 RX ADMIN — GADOBUTROL 8 ML: 604.72 INJECTION INTRAVENOUS at 15:58

## 2024-09-05 ENCOUNTER — OFFICE VISIT (OUTPATIENT)
Dept: NEUROLOGY | Facility: CLINIC | Age: 39
End: 2024-09-05
Payer: COMMERCIAL

## 2024-09-05 ENCOUNTER — LAB (OUTPATIENT)
Dept: LAB | Facility: CLINIC | Age: 39
End: 2024-09-05
Payer: COMMERCIAL

## 2024-09-05 VITALS — DIASTOLIC BLOOD PRESSURE: 66 MMHG | SYSTOLIC BLOOD PRESSURE: 101 MMHG | HEART RATE: 101 BPM

## 2024-09-05 DIAGNOSIS — G35 MS (MULTIPLE SCLEROSIS) (H): ICD-10-CM

## 2024-09-05 DIAGNOSIS — N31.9 NEUROGENIC BLADDER: ICD-10-CM

## 2024-09-05 DIAGNOSIS — E55.9 VITAMIN D DEFICIENCY: ICD-10-CM

## 2024-09-05 DIAGNOSIS — G35 MS (MULTIPLE SCLEROSIS) (H): Primary | ICD-10-CM

## 2024-09-05 DIAGNOSIS — Z51.81 THERAPEUTIC DRUG MONITORING: ICD-10-CM

## 2024-09-05 DIAGNOSIS — M79.671 HEEL PAIN, BILATERAL: ICD-10-CM

## 2024-09-05 DIAGNOSIS — M79.672 HEEL PAIN, BILATERAL: ICD-10-CM

## 2024-09-05 LAB
BASOPHILS # BLD AUTO: 0.1 10E3/UL (ref 0–0.2)
BASOPHILS NFR BLD AUTO: 1 %
CD19 B CELL COMMENT: ABNORMAL
CD19 CELLS # BLD: 1 CELLS/UL (ref 107–698)
CD19 CELLS NFR BLD: <1 % (ref 6–27)
EOSINOPHIL # BLD AUTO: 0.3 10E3/UL (ref 0–0.7)
EOSINOPHIL NFR BLD AUTO: 4 %
ERYTHROCYTE [DISTWIDTH] IN BLOOD BY AUTOMATED COUNT: 11.9 % (ref 10–15)
HCT VFR BLD AUTO: 42.1 % (ref 35–47)
HGB BLD-MCNC: 14.5 G/DL (ref 11.7–15.7)
IMM GRANULOCYTES # BLD: 0 10E3/UL
IMM GRANULOCYTES NFR BLD: 0 %
LYMPHOCYTES # BLD AUTO: 1.8 10E3/UL (ref 0.8–5.3)
LYMPHOCYTES NFR BLD AUTO: 22 %
MCH RBC QN AUTO: 30.3 PG (ref 26.5–33)
MCHC RBC AUTO-ENTMCNC: 34.4 G/DL (ref 31.5–36.5)
MCV RBC AUTO: 88 FL (ref 78–100)
MONOCYTES # BLD AUTO: 0.7 10E3/UL (ref 0–1.3)
MONOCYTES NFR BLD AUTO: 9 %
NEUTROPHILS # BLD AUTO: 5.2 10E3/UL (ref 1.6–8.3)
NEUTROPHILS NFR BLD AUTO: 65 %
NRBC # BLD AUTO: 0 10E3/UL
NRBC BLD AUTO-RTO: 0 /100
PLATELET # BLD AUTO: 371 10E3/UL (ref 150–450)
RBC # BLD AUTO: 4.79 10E6/UL (ref 3.8–5.2)
VIT D+METAB SERPL-MCNC: 66 NG/ML (ref 20–50)
WBC # BLD AUTO: 8 10E3/UL (ref 4–11)

## 2024-09-05 PROCEDURE — 36415 COLL VENOUS BLD VENIPUNCTURE: CPT

## 2024-09-05 PROCEDURE — 86355 B CELLS TOTAL COUNT: CPT

## 2024-09-05 PROCEDURE — 82306 VITAMIN D 25 HYDROXY: CPT

## 2024-09-05 PROCEDURE — 99214 OFFICE O/P EST MOD 30 MIN: CPT | Performed by: PSYCHIATRY & NEUROLOGY

## 2024-09-05 PROCEDURE — 82784 ASSAY IGA/IGD/IGG/IGM EACH: CPT

## 2024-09-05 PROCEDURE — 85025 COMPLETE CBC W/AUTO DIFF WBC: CPT

## 2024-09-05 RX ORDER — OXYBUTYNIN CHLORIDE 5 MG/1
5 TABLET ORAL 2 TIMES DAILY
Qty: 60 TABLET | Refills: 11 | Status: SHIPPED | OUTPATIENT
Start: 2024-10-01

## 2024-09-05 NOTE — PROGRESS NOTES
Date of Service: 9/5/2024    [facility] Neurology   MS Clinic Evaluation    Subjective: 38-year-old woman who presents in follow-up for multiple sclerosis.  She does have a history of folate deficiency.    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        Past medical, surgical, social and family history was personally reviewed. Pertinent details noted above.     Physical Examination:   /66 (BP Location: Left arm, Patient Position: Sitting)   Pulse 101     General: no acute distress  Cranial nerves:   VFFC  PER  EOM full w/no RACHELLE   Face symmetric  Hearing intact  No dysarthria   Motor:   Tone is normal   Bulk is normal     R L  Deltoid  5 5  Biceps  5 5  Triceps  5 5  Wrist ext 5 5  Finger ext 5 5  Finger abd 5 5    Hip flexion 5 5  Knee flexion 5 5  Knee ext 5 5  Ankle d/f 5 5    Reflexes: 2+ and symmetric throughout, babinski absent bilaterally  Sensory: vibration  is mildly reduced in the toes, JPS normal in the toes   Romberg is absent  Coordination: no ataxia or dysmetria  Gait: normal base and stride, tandem gait is intact, able to balance on one foot and hop x 5 bilaterally, able to get up from chair with single leg    Tests/Imaging:   HANDY virus Ab 0.15 -> 0.44 -< 0.37  Vitamin D 78  Folate 7.2 -> 12.8    Abs lymph 1600 -> 1500  Cd19 1  igg 765 -> 710    MRI brain   6/2019 - personally reviewed, multiple demyelinating lesions in the deep white matter, small, couple juxtacortical, large lesion in corpus callosum, multiple lesions gd+ including lesion in cc  7/1/2019 - new left frontal lesion gd- another lesion is new and is gd+, pontine lesion unchanged, stable large cc lesion  8/7/19 - left mcp lesion tumefactive gd+, multiple new/growing lesions, approx 9 gd+  10/2019 - no new lesions noted, resolution of mary enhancement  7/2020 - no new lesions, gd-   8/2022 - no new lesions, gd-  8/2024 - no new lesions     MRI cervical spine   6/2019 - dorsal cord lesions at c4-5 and c6, gd-   7/2020 - no new lesions  8/2022 - no new lesions, gd-  8/2024 - no new lesions     MRI thoracic spine   8/2022 - single lesion t6, gd-   8/2024 - no new lesions     Assessment: 38-year-old woman with relapsing-remitting multiple sclerosis who remains clinically and radiologically stable on ocrevus. No adverse effects of therapy. Will do blood testing for monitoring today.     Heel pain could be related to muscle tightness due to ms. Encouraged stretching of calves at bedtime. If no improvement, then visit with podiatry.     She will continue oxybutynin for management of urinary urgency.     Plan:   -Continue Ocrevus 600 mg IV every 6 months  - Blood work today to ensure no signs of toxicity  - Continue oxybutynin  - podiatry referral   - Follow-up in 1 year    Note was completed with the assistance of Dragon Fluency software which can often result in accidental word substitutions.     30 minutes  spent in the care of this patient on the date of service  Vicenta Dominguez MD on 9/5/2024 at 11:03 AM

## 2024-09-05 NOTE — LETTER
9/5/2024      Enedina Leong  [address] [address]      Dear Colleague,    Thank you for referring your patient, Enedina Leong, to the [facility] NEUROLOGY CLINIC [facility]. Please see a copy of my visit note below.    Date of Service: 9/5/2024    [facility] Neurology   MS Clinic Evaluation    Subjective: 38-year-old woman who presents in follow-up for multiple sclerosis.  She does have a history of folate deficiency.    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        Past medical, surgical, social and family history was personally reviewed. Pertinent details noted above.     Physical Examination:   /66 (BP Location: Left arm, Patient Position: Sitting)   Pulse 101     General: no acute distress  Cranial nerves:   VFFC  PER  EOM full w/no RACHELLE   Face symmetric  Hearing intact  No dysarthria   Motor:   Tone is normal   Bulk is  normal     R L  Deltoid  5 5  Biceps  5 5  Triceps  5 5  Wrist ext 5 5  Finger ext 5 5  Finger abd 5 5    Hip flexion 5 5  Knee flexion 5 5  Knee ext 5 5  Ankle d/f 5 5    Reflexes: 2+ and symmetric throughout, babinski absent bilaterally  Sensory: vibration is mildly reduced in the toes, JPS normal in the toes   Romberg is absent  Coordination: no ataxia or dysmetria  Gait: normal base and stride, tandem gait is intact, able to balance on one foot and hop x 5 bilaterally, able to get up from chair with single leg    Tests/Imaging:   HANDY virus Ab 0.15 -> 0.44 -< 0.37  Vitamin D 78  Folate 7.2 -> 12.8    Abs lymph 1600 -> 1500  Cd19 1  igg 765 -> 710    MRI brain   6/2019 - personally reviewed, multiple demyelinating lesions in the deep white matter, small, couple juxtacortical, large lesion in corpus callosum, multiple lesions gd+ including lesion in cc  7/1/2019 - new left frontal lesion gd- another lesion is new and is gd+, pontine lesion unchanged, stable large cc lesion  8/7/19 - left mcp lesion tumefactive gd+, multiple new/growing lesions, approx 9 gd+  10/2019 - no new lesions noted, resolution of mary enhancement  7/2020 - no new lesions, gd-   8/2022 - no new lesions, gd-  8/2024 - no new lesions     MRI cervical spine   6/2019 - dorsal cord lesions at c4-5 and c6, gd-   7/2020 - no new lesions  8/2022 - no new lesions, gd-  8/2024 - no new lesions     MRI thoracic spine   8/2022 - single lesion t6, gd-   8/2024 - no new lesions     Assessment: 38-year-old woman with relapsing-remitting multiple sclerosis who remains clinically and radiologically stable on ocrevus. No adverse effects of therapy. Will do blood testing for monitoring today.     Heel pain could be related to muscle tightness due to ms. Encouraged stretching of calves at bedtime. If no improvement, then visit with podiatry.     She will continue oxybutynin for management of urinary urgency.     Plan:   -Continue Ocrevus 600 mg IV every 6  months  - Blood work today to ensure no signs of toxicity  - Continue oxybutynin  - podiatry referral   - Follow-up in 1 year    Note was completed with the assistance of Dragon Fluency software which can often result in accidental word substitutions.     30 minutes spent in the care of this patient on the date of service  Vicenta Dominguez MD on 9/5/2024 at 11:03 AM            Again, thank you for allowing me to participate in the care of your patient.        Sincerely,        iVcenta Dominguez MD

## 2024-09-05 NOTE — PATIENT INSTRUCTIONS
Continue ocrevus     Blood work today - make sure they get labs from March and today     Your exam is stable     Heel pain   Try stretching your calves out nightly for 5 minutes each   If no improvement in heel pain with stretching, then visit with podiatry     Follow up in 1 year

## 2024-09-06 LAB — IGG SERPL-MCNC: 674 MG/DL (ref 610–1616)

## 2024-09-23 DIAGNOSIS — Z53.9 DIAGNOSIS NOT YET DEFINED: Primary | ICD-10-CM

## 2024-09-23 PROCEDURE — G0180 MD CERTIFICATION HHA PATIENT: HCPCS | Performed by: PSYCHIATRY & NEUROLOGY

## 2024-10-01 ENCOUNTER — OFFICE VISIT (OUTPATIENT)
Dept: PODIATRY | Facility: CLINIC | Age: 39
End: 2024-10-01
Payer: COMMERCIAL

## 2024-10-01 VITALS — OXYGEN SATURATION: 99 % | TEMPERATURE: 75 F | RESPIRATION RATE: 16 BRPM

## 2024-10-01 DIAGNOSIS — M72.2 PLANTAR FASCIITIS, BILATERAL: Primary | ICD-10-CM

## 2024-10-01 DIAGNOSIS — M24.573 EQUINUS CONTRACTURE OF ANKLE: ICD-10-CM

## 2024-10-01 PROCEDURE — 99203 OFFICE O/P NEW LOW 30 MIN: CPT | Performed by: PODIATRIST

## 2024-10-01 RX ORDER — NAPROXEN 500 MG/1
500 TABLET ORAL 2 TIMES DAILY WITH MEALS
Qty: 28 TABLET | Refills: 0 | Status: SHIPPED | OUTPATIENT
Start: 2024-10-01

## 2024-10-01 ASSESSMENT — PAIN SCALES - GENERAL: PAINLEVEL: MODERATE PAIN (4)

## 2024-10-01 NOTE — PATIENT INSTRUCTIONS
What is Plantar Fasciitis?  Plantar Fasciitis also referred to as  heel pain syndrome  is the most common cause cited for pain in heels. Plantar Fasciitis is the pain and inflammation at the point where the flat band of tissue called the plantar fascia connects the heel bone to the toes. Plantar fascia maintains the arch of the foot. Applying pressure on it will make it swollen, weak, and irritated. This will make the heel of your foot to ache when you walk or stand for a prolonged period.    Symptoms  Most people suffering from Plantar Fasciitis experience pain when they walk after resting their feet for a long duration. You may experience less pain and stiffness after a few steps. But your foot may hurt more as the day goes on. It may hurt the most when you climb stairs or after you stand for a long time. Continuous foot pain at night might be due to different problems like arthritis or nerve problems.    Causes  Straining the ligament which supports the arch can cause Plantar Fasciitis. Repeated straining can cause tiny tears in the ligament which lead to pain and swelling. Plantar Fasciitis is very evident in cases of:  Tight Achilles tendon or calf muscles   Running long distances, especially on hard & uneven surfaces   Problems of the foot arch   Sudden obesity   Wearing shoes with soft soles or poor arch support   In-toeing              PRO STRETCH - You can buy this on [employer]       [facility]  [facility]  [address] [place] [address] [address]  [place] [address]  Phone: [phone]  Fax: [phone] [facility]  [address] [place] [address]  Home Medical Equipment, [address] Phone: [phone]  Orthotics and Prosthetics, [address]  Phone [phone] [facility]  [address] [place] [address] [address]  [place] [address]  Phone: [phone]   Fax: [phone]   [facility]  [address]  [place] [address]  Phone: [phone]  Fax: [phone] [facility]   Home Medical Equipment   [address], [place] [address]  Phone :[phone]  Orthotics and Prosthetics  [address] [facility], [address], [facility] [address]  Phone:[phone]   [facility]  [address]  [place] [address]  Phone: [phone]   Fax: [phone]   [facility]   [address]  [place] [address]  Phone: [phone]  Fax: [phone] [facility]   [address]  [address]  [place] [address]  Phone: [phone] [place]  [address] [place] [place] [address]  Phone :[phone]             WEARING YOUR CUSTOM FOOT ORTHOTICS   Most insurance plans cover one pair of orthotics per year. You must check with your   insurance plan to see what your payment responsibility will be. Please call your   insurance company by calling the number on the back of your insurance card.   Orthotic's are non-refundable and non-returnable.   Orthotics are made of various designs. Some orthotics are covered with material that extends beyond your toes. If your orthotic is of this design, you will likely need to trim the toe end to get a proper fit. The insole from your shoe can be used as a template. Simply overlay the shoe insert on top of the custom orthotic. Align the heel end while tracing the length of the insert onto the custom orthotic. Use a large scissor to trim the toe end until you get a proper fit in the shoe.   The orthotic needs to be pushed as far back in the shoe as possible. The heel portion should not ride forward so as not to irritate your heel.   Orthotics are designed to work with socks. Excessive perspiration will shorten the life span of the orthotics. Remove the orthotic from the shoe frequently for proper drying.   The break-in period lasts for weeks. People new  to orthotics will likely experience new aches and pains. The orthotic is forcing your foot into a new position. Arch, foot and leg muscle aches and fatigue are common during these weeks. Minor discomfort can be considered normal break in phenomenon. Start wearing your orthotic around your home your first day. Limited activity for one to two hours is recommended. You can increase one or two additional hours each day provided the aches and pains are subsiding. The degree of discomfort, fatigue and problems will dictate the speed of break in. You may require multiple weeks to work up to full time use.   Do not continue wearing your orthotics if they are creating problems such as blisters or sores. Do not hesitate to call the clinic to speak with a nurse regarding orthotic   break in, fit, trimming, etc. You may also need to see the doctor if the orthotics are   simply not working out. Adjustments are sometimes made to improve orthotic   function.     Orthotics will only work in certain styles and types of shoes. Orthotics rarely work in dress shoes. Slip-ons, clogs, sandals and heels are particularly troublesome. Specially designed orthotics may be necessary for these types of shoes. Your custom orthotic was designed for activities that require appropriate walking or running shoes. Lace up athletic shoes, walking shoes or work boots should work appropriately. You may need a wider or longer shoe. Shoes with a removable  or insert work best. In general, you want to remove an insert from the shoe before placing the orthotic into the shoe. Shoes without a removable liner may not work as well.     When purchasing new shoes, bring your orthotics along to get a proper fit. Shop at stores that are familiar with orthotics.   Frequent washing of the orthotic may shorten the life span of the top cover. The top cover can be replaced but will generally last one to five years depending on use and foot perspiration.

## 2024-10-01 NOTE — LETTER
10/1/2024      Enedina Leong  [address] [address]      Dear Colleague,    Thank you for referring your patient, Enedina Leong, to the [facility] CLINIC [facility]. Please see a copy of my visit note below.    FOOT AND ANKLE SURGERY/PODIATRY Progress Note        ASSESSMENT:   Plantar Fasciitis bilateral   Gastrosoleus Equinus   MS      TREATMENT:  -Patient has pain along the plantar heel at insertion of plantar fascia consistent with plantar fasciitis.     -We discussed treatment options to include stretching exercises, anti-inflammatory medication, orthotics, steroid injections, physical therapy and a night splint.     -All questions invited and answered. I will start her on Naproxen and have referred her to [facility] for custom orthotics.     -I have asked her to follow-up after using the orthotics x3-4 weeks if symptoms continue.     Peter Nino DPM  [facility] Podiatry/Foot & Ankle Surgery      HPI: I was asked to see Enedina Leong today for bilateral heel pain.  Patient reports she started to notice bilateral heel pain in April of this year.  She has tried over-the-counter inserts with some relief.  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  Past medical history is noncontributory and is positive for MS.    No past medical history on file.    Past Surgical History:   Procedure Laterality Date     IR LUMBAR PUNCTURE  6/8/2019       No Known Allergies      Current Outpatient Medications:      ocrelizumab (OCREVUS) 300 MG/10ML SOLN injection, Inject 600 mg into the vein every 6 months, Disp: , Rfl:      oxyBUTYnin (DITROPAN) 5 MG tablet, Take 1 tablet (5 mg) by mouth 2 times daily. Do not start before October 1, 2024., Disp: 60 tablet, Rfl: 11     vitamin D3 (CHOLECALCIFEROL) 1.25 MG (13239 UT) capsule, Take 1 capsule (50,000 Units) by mouth once a week., Disp: 12 capsule, Rfl: 3    No family history on file.    Social History     Socioeconomic History      Marital status:      Spouse name: Not on file     Number of children: Not on file     Years of education: Not on file     Highest education level: Not on file   Occupational History     Not on file   Tobacco Use     Smoking status: Every Day     Types: Vaping Device     Smokeless tobacco: Never   Substance and Sexual Activity     Alcohol use: Not on file     Drug use: Not on file     Sexual activity: Not on file   Other Topics Concern     Not on file   Social History Narrative     Not on file     Social Determinants of Health     Financial Resource Strain: Not on file   Food Insecurity: Not on file   Transportation Needs: Not on file   Physical Activity: Not on file   Stress: Not on file   Social Connections: Not on file   Interpersonal Safety: Not on file   Housing Stability: Not on file       Review of Systems - 10 point Review of Systems is negative except for heel pain which is noted in HPI.    OBJECTIVE:  Appearance: alert, well appearing, and in no distress.    General appearance: Patient is alert and fully cooperative with history & exam.  No sign of distress is noted during the visit.     Psychiatric: Affect is pleasant & appropriate.  Patient appears motivated to improve health.     Respiratory: Breathing is regular & unlabored while sitting.     HEENT: Hearing is intact to spoken word.  Speech is clear.  No gross evidence of visual impairment that would impact ambulation.    Vascular: Dorsalis pedis and posterior tibial pulses are palpable. There is pedal hair growth bilateral.  CFT < 3 sec from anterior tibial surface to distal digits bilateral. There is no appreciable edema noted.  Dermatologic: Turgor and texture are within normal limits. No coloration or temperature changes. No primary or secondary lesions noted.  Neurologic: All epicritic and proprioceptive sensations are grossly intact bilateral.  Musculoskeletal: Mild pain along the plantar bilateral heels at the insertion of the plantar  fascia. Limited ankle dorsiflexion with knee extended and flexed.         Again, thank you for allowing me to participate in the care of your patient.        Sincerely,        Peter Nino DPM

## 2024-10-01 NOTE — PROGRESS NOTES
FOOT AND ANKLE SURGERY/PODIATRY Progress Note        ASSESSMENT:   Plantar Fasciitis bilateral   Gastrosoleus Equinus   MS      TREATMENT:  -Patient has pain along the plantar heel at insertion of plantar fascia consistent with plantar fasciitis.     -We discussed treatment options to include stretching exercises, anti-inflammatory medication, orthotics, steroid injections, physical therapy and a night splint.     -All questions invited and answered. I will start her on Naproxen and have referred her to [facility] for custom orthotics.     -I have asked her to follow-up after using the orthotics x3-4 weeks if symptoms continue.     Peter Nino DPM  [facility] Podiatry/Foot & Ankle Surgery      HPI: I was asked to see Enedina KIM Leong today for bilateral heel pain.  Patient reports she started to notice bilateral heel pain in April of this year.  She has tried over-the-counter inserts with some relief.  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  Past medical history is noncontributory and is positive for MS.    No past medical history on file.    Past Surgical History:   Procedure Laterality Date    IR LUMBAR PUNCTURE  6/8/2019       No Known Allergies      Current Outpatient Medications:     ocrelizumab (OCREVUS) 300 MG/10ML SOLN injection, Inject 600 mg into the vein every 6 months, Disp: , Rfl:     oxyBUTYnin (DITROPAN) 5 MG tablet, Take 1 tablet (5 mg) by mouth 2 times daily. Do not start before October 1, 2024., Disp: 60 tablet, Rfl: 11    vitamin D3 (CHOLECALCIFEROL) 1.25 MG (92225 UT) capsule, Take 1 capsule (50,000 Units) by mouth once a week., Disp: 12 capsule, Rfl: 3    No family history on file.    Social History     Socioeconomic History    Marital status:      Spouse name: Not on file    Number of children: Not on file    Years of education: Not on file    Highest education level: Not on file   Occupational History    Not on file   Tobacco Use    Smoking status: Every Day     Types:  Vaping Device    Smokeless tobacco: Never   Substance and Sexual Activity    Alcohol use: Not on file    Drug use: Not on file    Sexual activity: Not on file   Other Topics Concern    Not on file   Social History Narrative    Not on file     Social Determinants of Health     Financial Resource Strain: Not on file   Food Insecurity: Not on file   Transportation Needs: Not on file   Physical Activity: Not on file   Stress: Not on file   Social Connections: Not on file   Interpersonal Safety: Not on file   Housing Stability: Not on file       Review of Systems - 10 point Review of Systems is negative except for heel pain which is noted in HPI.    OBJECTIVE:  Appearance: alert, well appearing, and in no distress.    General appearance: Patient is alert and fully cooperative with history & exam.  No sign of distress is noted during the visit.     Psychiatric: Affect is pleasant & appropriate.  Patient appears motivated to improve health.     Respiratory: Breathing is regular & unlabored while sitting.     HEENT: Hearing is intact to spoken word.  Speech is clear.  No gross evidence of visual impairment that would impact ambulation.    Vascular: Dorsalis pedis and posterior tibial pulses are palpable. There is pedal hair growth bilateral.  CFT < 3 sec from anterior tibial surface to distal digits bilateral. There is no appreciable edema noted.  Dermatologic: Turgor and texture are within normal limits. No coloration or temperature changes. No primary or secondary lesions noted.  Neurologic: All epicritic and proprioceptive sensations are grossly intact bilateral.  Musculoskeletal: Mild pain along the plantar bilateral heels at the insertion of the plantar fascia. Limited ankle dorsiflexion with knee extended and flexed.

## 2024-10-08 ENCOUNTER — MYC MEDICAL ADVICE (OUTPATIENT)
Dept: NEUROLOGY | Facility: CLINIC | Age: 39
End: 2024-10-08
Payer: COMMERCIAL

## 2024-10-11 NOTE — TELEPHONE ENCOUNTER
Pt informed of message below and provided scheduling number to schedule future infusions.     Terence Barnhart, RN, BSN  [facility]

## 2024-10-11 NOTE — TELEPHONE ENCOUNTER
Nehemiah Keys; [facility]; Kylah Galan, RN5 hours ago (9:08 AM)     Hello,    Benefit has been checked, [facility] is in network with pt's BCBS plan. Both deductible $1750 and OOP $4000 have been fulfilled for the remainder of year.    Authorization is required for Ocrevus prior to service. Our PA team will be working on obtaining auth for medical necessity and will follow up if anything is needed.    Since pt is not due until March 2025, we may need to recheck coverage in January.    Thank you

## 2024-10-31 ENCOUNTER — ENROLLMENT (OUTPATIENT)
Dept: HOME HEALTH SERVICES | Facility: HOME HEALTH | Age: 39
End: 2024-10-31
Payer: COMMERCIAL

## 2025-01-09 ENCOUNTER — MEDICAL CORRESPONDENCE (OUTPATIENT)
Dept: HEALTH INFORMATION MANAGEMENT | Facility: CLINIC | Age: 40
End: 2025-01-09
Payer: COMMERCIAL

## 2025-02-03 ENCOUNTER — TELEPHONE (OUTPATIENT)
Dept: NEUROLOGY | Facility: CLINIC | Age: 40
End: 2025-02-03
Payer: COMMERCIAL

## 2025-02-03 DIAGNOSIS — G35 MS (MULTIPLE SCLEROSIS) (H): Primary | ICD-10-CM

## 2025-02-03 NOTE — TELEPHONE ENCOUNTER
----- Message from Shanelle Khanna sent at 1/31/2025 10:38 AM CST -----  Regarding: \[facility]\"" referral  Hello,    \[facility]\"" received a referral for Enedina's Ocrevus infusion (previously with [facility]).  She has been approved and is scheduled for 3/21/25.     When you are able, please update the therapy plan orders.    Also, she told the RN that she has had a past reaction to the Ocrevus?  We will plan to infuse over 3.5 hours , unless you feel otherwise? Or know detaisl about this reaction?    Please advise.    Thanks!    Shanelle Khanna, PharmD  [facility]

## 2025-02-03 NOTE — TELEPHONE ENCOUNTER
Called pt and relayed Dr. Dominguez message below. Pt verbalized understanding and does not have any questions.    I scheduled the pt's appt with the pharmacist for 2/11/2025.      Pt have no other questions.      DENIS Weston on 2/3/2025 at 3:23 PM

## 2025-02-03 NOTE — TELEPHONE ENCOUNTER
MTM referral placed     Can you notify patient that MTM visit is needed to get started with [facility]?     This is a video visit and they can do the visit if she is in [place]     Thanks, Vicenta Dominguez MD on 2/3/2025 at 1:41 PM

## 2025-02-11 ENCOUNTER — VIRTUAL VISIT (OUTPATIENT)
Dept: PHARMACY | Facility: CLINIC | Age: 40
End: 2025-02-11
Attending: PSYCHIATRY & NEUROLOGY
Payer: COMMERCIAL

## 2025-02-11 DIAGNOSIS — G35 MS (MULTIPLE SCLEROSIS) (H): Primary | ICD-10-CM

## 2025-02-11 RX ORDER — HEPARIN SODIUM,PORCINE 10 UNIT/ML
5-20 VIAL (ML) INTRAVENOUS DAILY PRN
OUTPATIENT
Start: 2025-02-17

## 2025-02-11 RX ORDER — DIPHENHYDRAMINE HYDROCHLORIDE 50 MG/ML
50 INJECTION INTRAMUSCULAR; INTRAVENOUS
Start: 2025-02-17

## 2025-02-11 RX ORDER — METHYLPREDNISOLONE SODIUM SUCCINATE 40 MG/ML
40 INJECTION INTRAMUSCULAR; INTRAVENOUS
Start: 2025-02-17

## 2025-02-11 RX ORDER — DIPHENHYDRAMINE HCL 50 MG
50 CAPSULE ORAL ONCE
OUTPATIENT
Start: 2025-02-17

## 2025-02-11 RX ORDER — ACETAMINOPHEN 325 MG/1
650 TABLET ORAL ONCE
OUTPATIENT
Start: 2025-02-17

## 2025-02-11 RX ORDER — ALBUTEROL SULFATE 90 UG/1
1-2 INHALANT RESPIRATORY (INHALATION)
Start: 2025-02-17

## 2025-02-11 RX ORDER — METHYLPREDNISOLONE SODIUM SUCCINATE 125 MG/2ML
125 INJECTION INTRAMUSCULAR; INTRAVENOUS ONCE
OUTPATIENT
Start: 2025-02-17

## 2025-02-11 RX ORDER — EPINEPHRINE 1 MG/ML
0.3 INJECTION, SOLUTION, CONCENTRATE INTRAVENOUS EVERY 5 MIN PRN
OUTPATIENT
Start: 2025-02-17

## 2025-02-11 RX ORDER — ALBUTEROL SULFATE 0.83 MG/ML
2.5 SOLUTION RESPIRATORY (INHALATION)
OUTPATIENT
Start: 2025-02-17

## 2025-02-11 RX ORDER — HEPARIN SODIUM (PORCINE) LOCK FLUSH IV SOLN 100 UNIT/ML 100 UNIT/ML
5 SOLUTION INTRAVENOUS
OUTPATIENT
Start: 2025-02-17

## 2025-02-11 RX ORDER — OCRELIZUMAB 300 MG/10ML
600 INJECTION INTRAVENOUS
Status: SHIPPED
Start: 2025-02-11

## 2025-02-11 RX ORDER — DIPHENHYDRAMINE HYDROCHLORIDE 50 MG/ML
25 INJECTION INTRAMUSCULAR; INTRAVENOUS
Start: 2025-02-17

## 2025-02-11 NOTE — PATIENT INSTRUCTIONS
"Recommendations from today's MTM visit:                                                    MTM (medication therapy management) is a service provided by a clinical pharmacist designed to help you get the most of out of your medicines.      You are scheduled for your first Ocrevus infusion with [facility] on 3/21/25. We have placed orders for your Ocrevus infusions going forward and these infusions will include having your blood drawn to check blood counts, B cells, protein levels.   I sent a message to the [facility] nurse regarding the preference for butterfly needle for IV access.     Follow-up: 8/12/25 at 3 pm video visit    It was great speaking with you today.  I value your experience and would be very thankful for your time in providing feedback in our clinic survey. In the next few days, you may receive an email or text message from [employer] with a link to a survey related to your  clinical pharmacist.\"     To schedule another MTM appointment, please call the clinic directly or you may call the MTM scheduling line at [phone].    My Clinical Pharmacist's contact information:                                                      Please feel free to contact me with any questions or concerns you have.      Haley Springer, Pharm.D.  Medication Therapy Management Pharmacist  [facility]     "

## 2025-02-11 NOTE — PROGRESS NOTES
Medication Therapy Management (MTM) Encounter    ASSESSMENT:                            Medication Adherence/Access: No issues identified.    MS:   Patient would benefit from continuing on Ocrevus infusions. She is already scheduled with [facility] but needs an updated therapy plan placed-- completed today. Routine blood tests (CBC, B cells, IgG) added to therapy plan for each infusion appointment.     PLAN:                            You are scheduled for your first Ocrevus infusion with [facility] on 3/21/25. We have placed orders for your Ocrevus infusions going forward and these infusions will include having your blood drawn to check blood counts, B cells, protein levels.   I sent a message to the [facility] nurse regarding the preference for butterfly needle for IV access.     Follow-up: 8/12/25 at 3 pm video visit    Addendum 2/11/25: [facility]  has made a note on their chart for Carly to see an ultrasound RN for the first visit to assess IV access.  SUBJECTIVE/OBJECTIVE:                          Carly Leong is a 39 year old female seen for an initial visit. She was referred to me from Dr. Dominguez.      Reason for visit: Ocrevus coordination-- transitioning to [facility].    Allergies/ADRs: Reviewed in chart  Past Medical History: Reviewed in chart  Tobacco: She reports that she has been smoking vaping device. She has never used smokeless tobacco.Nicotine/Tobacco Cessation Plan  Information offered: Patient not interested at this time   Alcohol: about 4 beverages / week  THC/CBD: none    Medication Adherence/Access: no issues reported. Patient has typically used the copay assistance card and was told insurance auth is effective through [facility] as of 1/22/25.    MS:   - Ocrevus 600 mg every 6 months, next dose scheduled with [facility] 3/21/25  - oxybutynin 5 mg once daily   - vitamin D 50,000 units weekly     Patient states she has been doing well on Ocrevus. She hasn't had any side effects since receiving the initial 300 mg dose and  she states that adding Benadryl as a pre-med has helped with no further reactions. She has been receiving rapid infusion rate through [facility]. For her last infusion they contracted with a company called [employer] and they had some IV access issues and recommended use of a butterfly needle going forward due to small veins. She has not had any change in MS symptoms or recent infections.     She reports that oxybutynin has been working well with 1 tablet daily and she does not experience any side effects such as dry mouth or constipation.     Disease Modifying Therapy History:   - Tysabri 8/1/2019 - 2/2020, JCV seroconversion   - Ocrevus 3/26/2020, 4/9/2020 - present LD 3/2024      Today's Vitals: There were no vitals taken for this visit.  ----------------    I spent 11 minutes with this patient today. All changes were made via collaborative practice agreement with Dr. Dominguez.     A summary of these recommendations was sent via [employer].    Haley Springer, Pharm.D.  Medication Therapy Management Pharmacist  [facility]    Telemedicine Visit Details  The patient's medications can be safely assessed via a telemedicine encounter.  Type of service:  Video Conference via [employer]  Originating Location (pt. Location): Home    Distant Location (provider location):  Off-site  Start Time:  3:04 PM  End Time: 3:15 PM     Medication Therapy Recommendations  No medication therapy recommendations to display

## 2025-03-07 ENCOUNTER — MEDICAL CORRESPONDENCE (OUTPATIENT)
Dept: HEALTH INFORMATION MANAGEMENT | Facility: CLINIC | Age: 40
End: 2025-03-07
Payer: COMMERCIAL

## 2025-03-13 ENCOUNTER — ENROLLMENT (OUTPATIENT)
Dept: HOME HEALTH SERVICES | Facility: HOME HEALTH | Age: 40
End: 2025-03-13
Payer: COMMERCIAL

## 2025-03-18 ENCOUNTER — HOME INFUSION BILLING (OUTPATIENT)
Dept: HOME HEALTH SERVICES | Facility: HOME HEALTH | Age: 40
End: 2025-03-18
Payer: COMMERCIAL

## 2025-03-21 ENCOUNTER — LAB REQUISITION (OUTPATIENT)
Dept: LAB | Facility: CLINIC | Age: 40
End: 2025-03-21
Payer: COMMERCIAL

## 2025-03-21 DIAGNOSIS — G35 MULTIPLE SCLEROSIS (H): ICD-10-CM

## 2025-03-21 LAB
BASOPHILS # BLD AUTO: 0.1 10E3/UL (ref 0–0.2)
BASOPHILS NFR BLD AUTO: 1 %
EOSINOPHIL # BLD AUTO: 0.4 10E3/UL (ref 0–0.7)
EOSINOPHIL NFR BLD AUTO: 4 %
ERYTHROCYTE [DISTWIDTH] IN BLOOD BY AUTOMATED COUNT: 12.2 % (ref 10–15)
HCT VFR BLD AUTO: 37.7 % (ref 35–47)
HGB BLD-MCNC: 13.1 G/DL (ref 11.7–15.7)
IMM GRANULOCYTES # BLD: 0 10E3/UL
IMM GRANULOCYTES NFR BLD: 0 %
LYMPHOCYTES # BLD AUTO: 1.7 10E3/UL (ref 0.8–5.3)
LYMPHOCYTES NFR BLD AUTO: 21 %
MCH RBC QN AUTO: 30.8 PG (ref 26.5–33)
MCHC RBC AUTO-ENTMCNC: 34.7 G/DL (ref 31.5–36.5)
MCV RBC AUTO: 89 FL (ref 78–100)
MONOCYTES # BLD AUTO: 0.7 10E3/UL (ref 0–1.3)
MONOCYTES NFR BLD AUTO: 9 %
NEUTROPHILS # BLD AUTO: 5.5 10E3/UL (ref 1.6–8.3)
NEUTROPHILS NFR BLD AUTO: 65 %
NRBC # BLD AUTO: 0 10E3/UL
NRBC BLD AUTO-RTO: 0 /100
PLATELET # BLD AUTO: 365 10E3/UL (ref 150–450)
RBC # BLD AUTO: 4.25 10E6/UL (ref 3.8–5.2)
WBC # BLD AUTO: 8.4 10E3/UL (ref 4–11)

## 2025-03-21 PROCEDURE — 36592 COLLECT BLOOD FROM PICC: CPT | Performed by: PSYCHIATRY & NEUROLOGY

## 2025-03-21 PROCEDURE — 82784 ASSAY IGA/IGD/IGG/IGM EACH: CPT | Performed by: PSYCHIATRY & NEUROLOGY

## 2025-03-21 PROCEDURE — 85025 COMPLETE CBC W/AUTO DIFF WBC: CPT | Performed by: PSYCHIATRY & NEUROLOGY

## 2025-03-21 PROCEDURE — 86355 B CELLS TOTAL COUNT: CPT | Performed by: PSYCHIATRY & NEUROLOGY

## 2025-03-22 LAB
CD19 B CELL COMMENT: ABNORMAL
CD19 CELLS # BLD: <1 CELLS/UL (ref 107–698)
CD19 CELLS NFR BLD: <1 % (ref 6–27)

## 2025-03-24 LAB — IGG SERPL-MCNC: 628 MG/DL (ref 610–1616)

## 2025-04-01 PROCEDURE — E0781 EXTERNAL AMBULATORY INFUS PU: HCPCS | Mod: RR

## 2025-04-02 PROCEDURE — E0781 EXTERNAL AMBULATORY INFUS PU: HCPCS | Mod: RR

## 2025-04-03 PROCEDURE — E0781 EXTERNAL AMBULATORY INFUS PU: HCPCS | Mod: RR

## 2025-04-04 PROCEDURE — E0781 EXTERNAL AMBULATORY INFUS PU: HCPCS | Mod: RR

## 2025-04-05 PROCEDURE — E0781 EXTERNAL AMBULATORY INFUS PU: HCPCS | Mod: RR

## 2025-04-06 PROCEDURE — E0781 EXTERNAL AMBULATORY INFUS PU: HCPCS | Mod: RR

## 2025-04-07 PROCEDURE — E0781 EXTERNAL AMBULATORY INFUS PU: HCPCS | Mod: RR

## 2025-04-08 PROCEDURE — E0781 EXTERNAL AMBULATORY INFUS PU: HCPCS | Mod: RR

## 2025-04-09 PROCEDURE — E0781 EXTERNAL AMBULATORY INFUS PU: HCPCS | Mod: RR

## 2025-04-10 PROCEDURE — E0781 EXTERNAL AMBULATORY INFUS PU: HCPCS | Mod: RR

## 2025-04-11 PROCEDURE — E0781 EXTERNAL AMBULATORY INFUS PU: HCPCS | Mod: RR

## 2025-04-12 PROCEDURE — E0781 EXTERNAL AMBULATORY INFUS PU: HCPCS | Mod: RR

## 2025-04-13 PROCEDURE — E0781 EXTERNAL AMBULATORY INFUS PU: HCPCS | Mod: RR

## 2025-04-14 PROCEDURE — E0781 EXTERNAL AMBULATORY INFUS PU: HCPCS | Mod: RR

## 2025-04-15 PROCEDURE — E0781 EXTERNAL AMBULATORY INFUS PU: HCPCS | Mod: RR

## 2025-04-16 PROCEDURE — E0781 EXTERNAL AMBULATORY INFUS PU: HCPCS | Mod: RR

## 2025-04-17 PROCEDURE — E0781 EXTERNAL AMBULATORY INFUS PU: HCPCS | Mod: RR

## 2025-04-18 PROCEDURE — E0781 EXTERNAL AMBULATORY INFUS PU: HCPCS | Mod: RR

## 2025-04-19 PROCEDURE — E0781 EXTERNAL AMBULATORY INFUS PU: HCPCS | Mod: RR

## 2025-04-20 PROCEDURE — E0781 EXTERNAL AMBULATORY INFUS PU: HCPCS | Mod: RR

## 2025-04-21 PROCEDURE — E0781 EXTERNAL AMBULATORY INFUS PU: HCPCS | Mod: RR

## 2025-04-22 PROCEDURE — E0781 EXTERNAL AMBULATORY INFUS PU: HCPCS | Mod: RR

## 2025-04-23 PROCEDURE — E0781 EXTERNAL AMBULATORY INFUS PU: HCPCS | Mod: RR

## 2025-04-24 PROCEDURE — E0781 EXTERNAL AMBULATORY INFUS PU: HCPCS | Mod: RR

## 2025-04-25 PROCEDURE — E0781 EXTERNAL AMBULATORY INFUS PU: HCPCS | Mod: RR

## 2025-04-26 PROCEDURE — E0781 EXTERNAL AMBULATORY INFUS PU: HCPCS | Mod: RR

## 2025-04-27 PROCEDURE — E0781 EXTERNAL AMBULATORY INFUS PU: HCPCS | Mod: RR

## 2025-04-28 PROCEDURE — E0781 EXTERNAL AMBULATORY INFUS PU: HCPCS | Mod: RR

## 2025-04-29 PROCEDURE — E0781 EXTERNAL AMBULATORY INFUS PU: HCPCS | Mod: RR

## 2025-04-30 PROCEDURE — E0781 EXTERNAL AMBULATORY INFUS PU: HCPCS | Mod: RR

## 2025-05-01 PROCEDURE — E0781 EXTERNAL AMBULATORY INFUS PU: HCPCS | Mod: RR

## 2025-05-02 PROCEDURE — E0781 EXTERNAL AMBULATORY INFUS PU: HCPCS | Mod: RR

## 2025-05-03 PROCEDURE — E0781 EXTERNAL AMBULATORY INFUS PU: HCPCS | Mod: RR

## 2025-05-04 PROCEDURE — E0781 EXTERNAL AMBULATORY INFUS PU: HCPCS | Mod: RR

## 2025-05-05 PROCEDURE — E0781 EXTERNAL AMBULATORY INFUS PU: HCPCS | Mod: RR

## 2025-05-06 PROCEDURE — E0781 EXTERNAL AMBULATORY INFUS PU: HCPCS | Mod: RR

## 2025-05-07 PROCEDURE — E0781 EXTERNAL AMBULATORY INFUS PU: HCPCS | Mod: RR

## 2025-05-08 PROCEDURE — E0781 EXTERNAL AMBULATORY INFUS PU: HCPCS | Mod: RR

## 2025-05-09 PROCEDURE — E0781 EXTERNAL AMBULATORY INFUS PU: HCPCS | Mod: RR

## 2025-05-10 PROCEDURE — E0781 EXTERNAL AMBULATORY INFUS PU: HCPCS | Mod: RR

## 2025-05-11 PROCEDURE — E0781 EXTERNAL AMBULATORY INFUS PU: HCPCS | Mod: RR

## 2025-05-12 PROCEDURE — E0781 EXTERNAL AMBULATORY INFUS PU: HCPCS | Mod: RR

## 2025-05-13 PROCEDURE — E0781 EXTERNAL AMBULATORY INFUS PU: HCPCS | Mod: RR

## 2025-05-14 PROCEDURE — E0781 EXTERNAL AMBULATORY INFUS PU: HCPCS | Mod: RR

## 2025-05-15 PROCEDURE — E0781 EXTERNAL AMBULATORY INFUS PU: HCPCS | Mod: RR

## 2025-05-16 PROCEDURE — E0781 EXTERNAL AMBULATORY INFUS PU: HCPCS | Mod: RR

## 2025-05-17 PROCEDURE — E0781 EXTERNAL AMBULATORY INFUS PU: HCPCS | Mod: RR

## 2025-05-18 PROCEDURE — E0781 EXTERNAL AMBULATORY INFUS PU: HCPCS | Mod: RR

## 2025-05-19 PROCEDURE — E0781 EXTERNAL AMBULATORY INFUS PU: HCPCS | Mod: RR

## 2025-05-20 PROCEDURE — E0781 EXTERNAL AMBULATORY INFUS PU: HCPCS | Mod: RR

## 2025-05-21 PROCEDURE — E0781 EXTERNAL AMBULATORY INFUS PU: HCPCS | Mod: RR

## 2025-05-22 PROCEDURE — E0781 EXTERNAL AMBULATORY INFUS PU: HCPCS | Mod: RR

## 2025-05-23 PROCEDURE — E0781 EXTERNAL AMBULATORY INFUS PU: HCPCS | Mod: RR

## 2025-05-24 PROCEDURE — E0781 EXTERNAL AMBULATORY INFUS PU: HCPCS | Mod: RR

## 2025-05-25 PROCEDURE — E0781 EXTERNAL AMBULATORY INFUS PU: HCPCS | Mod: RR

## 2025-05-26 PROCEDURE — E0781 EXTERNAL AMBULATORY INFUS PU: HCPCS | Mod: RR

## 2025-05-27 PROCEDURE — E0781 EXTERNAL AMBULATORY INFUS PU: HCPCS | Mod: RR

## 2025-05-28 PROCEDURE — E0781 EXTERNAL AMBULATORY INFUS PU: HCPCS | Mod: RR

## 2025-05-29 PROCEDURE — E0781 EXTERNAL AMBULATORY INFUS PU: HCPCS | Mod: RR

## 2025-05-30 PROCEDURE — E0781 EXTERNAL AMBULATORY INFUS PU: HCPCS | Mod: RR

## 2025-05-31 PROCEDURE — E0781 EXTERNAL AMBULATORY INFUS PU: HCPCS | Mod: RR

## 2025-06-01 PROCEDURE — E0781 EXTERNAL AMBULATORY INFUS PU: HCPCS | Mod: RR

## 2025-06-02 PROCEDURE — E0781 EXTERNAL AMBULATORY INFUS PU: HCPCS | Mod: RR

## 2025-06-03 PROCEDURE — E0781 EXTERNAL AMBULATORY INFUS PU: HCPCS | Mod: RR

## 2025-06-04 PROCEDURE — E0781 EXTERNAL AMBULATORY INFUS PU: HCPCS | Mod: RR

## 2025-06-05 PROCEDURE — E0781 EXTERNAL AMBULATORY INFUS PU: HCPCS | Mod: RR

## 2025-06-06 PROCEDURE — E0781 EXTERNAL AMBULATORY INFUS PU: HCPCS | Mod: RR

## 2025-06-07 PROCEDURE — E0781 EXTERNAL AMBULATORY INFUS PU: HCPCS | Mod: RR

## 2025-06-08 PROCEDURE — E0781 EXTERNAL AMBULATORY INFUS PU: HCPCS | Mod: RR

## 2025-06-09 PROCEDURE — E0781 EXTERNAL AMBULATORY INFUS PU: HCPCS | Mod: RR

## 2025-06-10 PROCEDURE — E0781 EXTERNAL AMBULATORY INFUS PU: HCPCS | Mod: RR

## 2025-06-11 PROCEDURE — E0781 EXTERNAL AMBULATORY INFUS PU: HCPCS | Mod: RR

## 2025-06-12 PROCEDURE — E0781 EXTERNAL AMBULATORY INFUS PU: HCPCS | Mod: RR

## 2025-06-13 PROCEDURE — E0781 EXTERNAL AMBULATORY INFUS PU: HCPCS | Mod: RR

## 2025-06-14 PROCEDURE — E0781 EXTERNAL AMBULATORY INFUS PU: HCPCS | Mod: RR

## 2025-06-15 PROCEDURE — E0781 EXTERNAL AMBULATORY INFUS PU: HCPCS | Mod: RR

## 2025-06-16 PROCEDURE — E0781 EXTERNAL AMBULATORY INFUS PU: HCPCS | Mod: RR

## 2025-06-17 PROCEDURE — E0781 EXTERNAL AMBULATORY INFUS PU: HCPCS | Mod: RR

## 2025-06-18 PROCEDURE — E0781 EXTERNAL AMBULATORY INFUS PU: HCPCS | Mod: RR

## 2025-06-19 PROCEDURE — E0781 EXTERNAL AMBULATORY INFUS PU: HCPCS | Mod: RR

## 2025-06-20 PROCEDURE — E0781 EXTERNAL AMBULATORY INFUS PU: HCPCS | Mod: RR

## 2025-06-21 PROCEDURE — E0781 EXTERNAL AMBULATORY INFUS PU: HCPCS | Mod: RR

## 2025-06-22 PROCEDURE — E0781 EXTERNAL AMBULATORY INFUS PU: HCPCS | Mod: RR

## 2025-06-23 PROCEDURE — E0781 EXTERNAL AMBULATORY INFUS PU: HCPCS | Mod: RR

## 2025-06-24 PROCEDURE — E0781 EXTERNAL AMBULATORY INFUS PU: HCPCS | Mod: RR

## 2025-06-25 PROCEDURE — E0781 EXTERNAL AMBULATORY INFUS PU: HCPCS | Mod: RR

## 2025-06-26 PROCEDURE — E0781 EXTERNAL AMBULATORY INFUS PU: HCPCS | Mod: RR

## 2025-06-27 PROCEDURE — E0781 EXTERNAL AMBULATORY INFUS PU: HCPCS | Mod: RR

## 2025-06-28 PROCEDURE — E0781 EXTERNAL AMBULATORY INFUS PU: HCPCS | Mod: RR

## 2025-06-29 PROCEDURE — E0781 EXTERNAL AMBULATORY INFUS PU: HCPCS | Mod: RR

## 2025-06-30 PROCEDURE — E0781 EXTERNAL AMBULATORY INFUS PU: HCPCS | Mod: RR

## 2025-07-01 PROCEDURE — E0781 EXTERNAL AMBULATORY INFUS PU: HCPCS | Mod: RR

## 2025-07-02 PROCEDURE — E0781 EXTERNAL AMBULATORY INFUS PU: HCPCS | Mod: RR

## 2025-07-03 PROCEDURE — E0781 EXTERNAL AMBULATORY INFUS PU: HCPCS | Mod: RR

## 2025-07-04 PROCEDURE — E0781 EXTERNAL AMBULATORY INFUS PU: HCPCS | Mod: RR

## 2025-07-05 PROCEDURE — E0781 EXTERNAL AMBULATORY INFUS PU: HCPCS | Mod: RR

## 2025-07-06 PROCEDURE — E0781 EXTERNAL AMBULATORY INFUS PU: HCPCS | Mod: RR

## 2025-07-07 PROCEDURE — E0781 EXTERNAL AMBULATORY INFUS PU: HCPCS | Mod: RR

## 2025-07-08 PROCEDURE — E0781 EXTERNAL AMBULATORY INFUS PU: HCPCS | Mod: RR

## 2025-07-09 PROCEDURE — E0781 EXTERNAL AMBULATORY INFUS PU: HCPCS | Mod: RR

## 2025-07-10 PROCEDURE — E0781 EXTERNAL AMBULATORY INFUS PU: HCPCS | Mod: RR

## 2025-07-11 PROCEDURE — E0781 EXTERNAL AMBULATORY INFUS PU: HCPCS | Mod: RR

## 2025-07-12 PROCEDURE — E0781 EXTERNAL AMBULATORY INFUS PU: HCPCS | Mod: RR

## 2025-07-13 PROCEDURE — E0781 EXTERNAL AMBULATORY INFUS PU: HCPCS | Mod: RR

## 2025-07-14 PROCEDURE — E0781 EXTERNAL AMBULATORY INFUS PU: HCPCS | Mod: RR

## 2025-07-15 DIAGNOSIS — E55.9 VITAMIN D DEFICIENCY: ICD-10-CM

## 2025-07-15 PROCEDURE — E0781 EXTERNAL AMBULATORY INFUS PU: HCPCS | Mod: RR

## 2025-07-15 NOTE — TELEPHONE ENCOUNTER
Pending Prescriptions:                       Disp   Refills    vitamin D3 (CHOLECALCIFEROL) 1.25 MG (500*12 cap*3            Sig: Take 1 capsule (50,000 Units) by mouth once a           week.      Next appt:    9/8/2025 9:00 AM (Arrive by 8:45 AM) Vicenta Dominguez MD [facility]         Medication T'd for review and signature    DENIS Weston on 7/15/2025 at 10:02 AM

## 2025-07-16 PROCEDURE — E0781 EXTERNAL AMBULATORY INFUS PU: HCPCS | Mod: RR

## 2025-07-17 PROCEDURE — E0781 EXTERNAL AMBULATORY INFUS PU: HCPCS | Mod: RR

## 2025-07-18 PROCEDURE — E0781 EXTERNAL AMBULATORY INFUS PU: HCPCS | Mod: RR

## 2025-07-19 PROCEDURE — E0781 EXTERNAL AMBULATORY INFUS PU: HCPCS | Mod: RR

## 2025-07-20 PROCEDURE — E0781 EXTERNAL AMBULATORY INFUS PU: HCPCS | Mod: RR

## 2025-07-21 PROCEDURE — E0781 EXTERNAL AMBULATORY INFUS PU: HCPCS | Mod: RR

## 2025-07-22 PROCEDURE — E0781 EXTERNAL AMBULATORY INFUS PU: HCPCS | Mod: RR

## 2025-07-23 PROCEDURE — E0781 EXTERNAL AMBULATORY INFUS PU: HCPCS | Mod: RR

## 2025-07-24 PROCEDURE — E0781 EXTERNAL AMBULATORY INFUS PU: HCPCS | Mod: RR

## 2025-07-25 PROCEDURE — E0781 EXTERNAL AMBULATORY INFUS PU: HCPCS | Mod: RR

## 2025-07-26 PROCEDURE — E0781 EXTERNAL AMBULATORY INFUS PU: HCPCS | Mod: RR

## 2025-07-27 PROCEDURE — E0781 EXTERNAL AMBULATORY INFUS PU: HCPCS | Mod: RR

## 2025-07-28 PROCEDURE — E0781 EXTERNAL AMBULATORY INFUS PU: HCPCS | Mod: RR

## 2025-07-29 PROCEDURE — E0781 EXTERNAL AMBULATORY INFUS PU: HCPCS | Mod: RR

## 2025-07-30 PROCEDURE — E0781 EXTERNAL AMBULATORY INFUS PU: HCPCS | Mod: RR

## 2025-07-31 PROCEDURE — E0781 EXTERNAL AMBULATORY INFUS PU: HCPCS | Mod: RR

## 2025-08-01 PROCEDURE — E0781 EXTERNAL AMBULATORY INFUS PU: HCPCS | Mod: RR

## 2025-08-02 PROCEDURE — E0781 EXTERNAL AMBULATORY INFUS PU: HCPCS | Mod: RR

## 2025-08-03 PROCEDURE — E0781 EXTERNAL AMBULATORY INFUS PU: HCPCS | Mod: RR

## 2025-08-04 PROCEDURE — E0781 EXTERNAL AMBULATORY INFUS PU: HCPCS | Mod: RR

## 2025-08-10 ENCOUNTER — HEALTH MAINTENANCE LETTER (OUTPATIENT)
Age: 40
End: 2025-08-10

## 2025-08-13 ENCOUNTER — VIRTUAL VISIT (OUTPATIENT)
Dept: PHARMACY | Facility: CLINIC | Age: 40
End: 2025-08-13
Attending: PSYCHIATRY & NEUROLOGY
Payer: COMMERCIAL

## 2025-08-13 DIAGNOSIS — G35 MS (MULTIPLE SCLEROSIS) (H): Primary | ICD-10-CM

## 2025-08-14 DIAGNOSIS — G35 MS (MULTIPLE SCLEROSIS) (H): ICD-10-CM

## 2025-08-14 RX ORDER — ACETAMINOPHEN 325 MG/1
650 TABLET ORAL
Qty: 2 TABLET | Refills: 0 | Status: ACTIVE | OUTPATIENT
Start: 2025-08-14 | End: 2026-02-11

## 2025-08-14 RX ORDER — SODIUM CHLORIDE 9 MG/ML
500 INJECTION, SOLUTION INTRAVENOUS PRN
Qty: 999999 ML | Refills: 0 | Status: ACTIVE | OUTPATIENT
Start: 2025-08-14 | End: 2026-02-11

## 2025-08-14 RX ORDER — EPINEPHRINE 0.3 MG/.3ML
0.3 INJECTION SUBCUTANEOUS PRN
Qty: 999999 ML | Refills: 0 | Status: ACTIVE | OUTPATIENT
Start: 2025-08-14 | End: 2026-02-11

## 2025-08-14 RX ORDER — DIPHENHYDRAMINE HCL 25 MG
50 CAPSULE ORAL
Qty: 999999 CAPSULE | Refills: 0 | Status: ACTIVE | OUTPATIENT
Start: 2025-08-14 | End: 2026-02-11

## 2025-08-14 RX ORDER — DIPHENHYDRAMINE HYDROCHLORIDE 50 MG/ML
50 INJECTION, SOLUTION INTRAMUSCULAR; INTRAVENOUS PRN
Qty: 99999 ML | Refills: 0 | Status: ACTIVE | OUTPATIENT
Start: 2025-08-14 | End: 2026-02-11